# Patient Record
Sex: FEMALE | Race: BLACK OR AFRICAN AMERICAN | NOT HISPANIC OR LATINO | Employment: OTHER | URBAN - METROPOLITAN AREA
[De-identification: names, ages, dates, MRNs, and addresses within clinical notes are randomized per-mention and may not be internally consistent; named-entity substitution may affect disease eponyms.]

---

## 2017-01-30 ENCOUNTER — ALLSCRIPTS OFFICE VISIT (OUTPATIENT)
Dept: OTHER | Facility: OTHER | Age: 52
End: 2017-01-30

## 2017-04-24 ENCOUNTER — ALLSCRIPTS OFFICE VISIT (OUTPATIENT)
Dept: OTHER | Facility: OTHER | Age: 52
End: 2017-04-24

## 2017-04-25 ENCOUNTER — TRANSCRIBE ORDERS (OUTPATIENT)
Dept: ADMINISTRATIVE | Facility: HOSPITAL | Age: 52
End: 2017-04-25

## 2017-04-25 DIAGNOSIS — N63.0 BREAST NODULE: Primary | ICD-10-CM

## 2017-05-03 ENCOUNTER — HOSPITAL ENCOUNTER (OUTPATIENT)
Dept: RADIOLOGY | Facility: HOSPITAL | Age: 52
Discharge: HOME | End: 2017-05-03
Payer: MEDICARE

## 2017-05-03 DIAGNOSIS — N63.0 BREAST NODULE: ICD-10-CM

## 2017-05-03 PROCEDURE — 76642 ULTRASOUND BREAST LIMITED: CPT

## 2017-05-16 RX ORDER — NUT.TX.GLUC.INTOLER,LAC-FR,SOY
120 LIQUID (ML) ORAL 2 TIMES DAILY
COMMUNITY
End: 2018-12-05

## 2017-05-16 RX ORDER — INSULIN GLARGINE 100 [IU]/ML
10 INJECTION, SOLUTION SUBCUTANEOUS DAILY
COMMUNITY

## 2017-05-16 RX ORDER — DIAZEPAM 10 MG/2ML
GEL RECTAL AS NEEDED
COMMUNITY

## 2017-05-18 PROBLEM — K05.30 ADULT PERIODONTITIS: Chronic | Status: ACTIVE | Noted: 2017-05-18

## 2017-05-19 ENCOUNTER — ANESTHESIA EVENT (OUTPATIENT)
Dept: PERIOP | Facility: HOSPITAL | Age: 52
End: 2017-05-19
Payer: MEDICARE

## 2017-05-22 ENCOUNTER — ANESTHESIA (OUTPATIENT)
Dept: PERIOP | Facility: HOSPITAL | Age: 52
End: 2017-05-22
Payer: MEDICARE

## 2017-05-22 ENCOUNTER — HOSPITAL ENCOUNTER (OUTPATIENT)
Facility: HOSPITAL | Age: 52
Setting detail: OUTPATIENT SURGERY
Discharge: ANOTHER HOSPITAL | End: 2017-05-22
Attending: DENTIST | Admitting: DENTIST
Payer: MEDICARE

## 2017-05-22 ENCOUNTER — HOSPITAL ENCOUNTER (OUTPATIENT)
Dept: RADIOLOGY | Facility: HOSPITAL | Age: 52
Setting detail: OUTPATIENT SURGERY
Discharge: HOME | End: 2017-05-22
Payer: MEDICARE

## 2017-05-22 VITALS
OXYGEN SATURATION: 94 % | HEART RATE: 98 BPM | RESPIRATION RATE: 18 BRPM | DIASTOLIC BLOOD PRESSURE: 73 MMHG | WEIGHT: 113.4 LBS | HEIGHT: 57 IN | SYSTOLIC BLOOD PRESSURE: 104 MMHG | TEMPERATURE: 97.2 F | BODY MASS INDEX: 24.47 KG/M2

## 2017-05-22 PROBLEM — K05.30 ADULT PERIODONTITIS: Chronic | Status: RESOLVED | Noted: 2017-05-18 | Resolved: 2017-05-22

## 2017-05-22 RX ORDER — CLINDAMYCIN PHOSPHATE 600 MG/50ML
600 INJECTION INTRAVENOUS
Status: DISCONTINUED | OUTPATIENT
Start: 2017-05-22 | End: 2017-05-22 | Stop reason: HOSPADM

## 2017-05-22 RX ORDER — LIDOCAINE HYDROCHLORIDE 10 MG/ML
INJECTION, SOLUTION INFILTRATION; PERINEURAL AS NEEDED
Status: DISCONTINUED | OUTPATIENT
Start: 2017-05-22 | End: 2017-05-22 | Stop reason: SURG

## 2017-05-22 RX ORDER — CHLORHEXIDINE GLUCONATE 0.12 MG/ML
RINSE ORAL AS NEEDED
Status: DISCONTINUED | OUTPATIENT
Start: 2017-05-22 | End: 2017-05-22 | Stop reason: HOSPADM

## 2017-05-22 RX ORDER — FENTANYL CITRATE 50 UG/ML
INJECTION, SOLUTION INTRAMUSCULAR; INTRAVENOUS AS NEEDED
Status: DISCONTINUED | OUTPATIENT
Start: 2017-05-22 | End: 2017-05-22 | Stop reason: SURG

## 2017-05-22 RX ORDER — GLYCOPYRROLATE 0.2 MG/ML
INJECTION INTRAMUSCULAR; INTRAVENOUS AS NEEDED
Status: DISCONTINUED | OUTPATIENT
Start: 2017-05-22 | End: 2017-05-22 | Stop reason: SURG

## 2017-05-22 RX ORDER — CLINDAMYCIN PHOSPHATE 150 MG/ML
INJECTION, SOLUTION INTRAVENOUS AS NEEDED
Status: DISCONTINUED | OUTPATIENT
Start: 2017-05-22 | End: 2017-05-22 | Stop reason: SURG

## 2017-05-22 RX ORDER — ROCURONIUM BROMIDE 10 MG/ML
INJECTION, SOLUTION INTRAVENOUS AS NEEDED
Status: DISCONTINUED | OUTPATIENT
Start: 2017-05-22 | End: 2017-05-22 | Stop reason: SURG

## 2017-05-22 RX ORDER — SODIUM CHLORIDE, SODIUM LACTATE, POTASSIUM CHLORIDE, CALCIUM CHLORIDE 600; 310; 30; 20 MG/100ML; MG/100ML; MG/100ML; MG/100ML
75 INJECTION, SOLUTION INTRAVENOUS CONTINUOUS
Status: DISCONTINUED | OUTPATIENT
Start: 2017-05-22 | End: 2017-05-22 | Stop reason: HOSPADM

## 2017-05-22 RX ORDER — BUPIVACAINE HYDROCHLORIDE AND EPINEPHRINE 5; 5 MG/ML; UG/ML
INJECTION, SOLUTION PERINEURAL AS NEEDED
Status: DISCONTINUED | OUTPATIENT
Start: 2017-05-22 | End: 2017-05-22 | Stop reason: HOSPADM

## 2017-05-22 RX ORDER — SODIUM CHLORIDE, SODIUM LACTATE, POTASSIUM CHLORIDE, CALCIUM CHLORIDE 600; 310; 30; 20 MG/100ML; MG/100ML; MG/100ML; MG/100ML
75 INJECTION, SOLUTION INTRAVENOUS CONTINUOUS
Status: CANCELLED | OUTPATIENT
Start: 2017-05-22

## 2017-05-22 RX ORDER — ONDANSETRON 2 MG/ML
INJECTION INTRAMUSCULAR; INTRAVENOUS AS NEEDED
Status: DISCONTINUED | OUTPATIENT
Start: 2017-05-22 | End: 2017-05-22 | Stop reason: SURG

## 2017-05-22 RX ORDER — PROPOFOL 10 MG/ML
INJECTION, EMULSION INTRAVENOUS AS NEEDED
Status: DISCONTINUED | OUTPATIENT
Start: 2017-05-22 | End: 2017-05-22 | Stop reason: SURG

## 2017-05-22 RX ADMIN — CLINDAMYCIN PHOSPHATE 600 MG: 150 INJECTION, SOLUTION INTRAMUSCULAR; INTRAVENOUS at 07:58

## 2017-05-22 RX ADMIN — LIDOCAINE HYDROCHLORIDE 40 MG: 10 INJECTION, SOLUTION INFILTRATION; PERINEURAL at 07:55

## 2017-05-22 RX ADMIN — ROCURONIUM BROMIDE 30 MG: 10 INJECTION, SOLUTION INTRAVENOUS at 07:55

## 2017-05-22 RX ADMIN — PHENYLEPHRINE HYDROCHLORIDE 0.1 MCG: 10 INJECTION, SOLUTION INTRAMUSCULAR; INTRAVENOUS; SUBCUTANEOUS at 08:02

## 2017-05-22 RX ADMIN — FENTANYL CITRATE 100 MCG: 50 INJECTION, SOLUTION INTRAMUSCULAR; INTRAVENOUS at 07:53

## 2017-05-22 RX ADMIN — PROPOFOL 100 MG: 10 INJECTION, EMULSION INTRAVENOUS at 07:55

## 2017-05-22 RX ADMIN — GLYCOPYRROLATE 0.4 MG: 0.2 INJECTION, SOLUTION INTRAMUSCULAR; INTRAVENOUS at 09:25

## 2017-05-22 RX ADMIN — NEOSTIGMINE METHYLSULFATE 3 MG: 1 INJECTION, SOLUTION INTRAMUSCULAR; INTRAVENOUS; SUBCUTANEOUS at 09:25

## 2017-05-22 RX ADMIN — DEXAMETHASONE SODIUM PHOSPHATE 4 MG: 10 INJECTION INTRAMUSCULAR; INTRAVENOUS at 08:08

## 2017-05-22 RX ADMIN — ONDANSETRON 4 MG: 2 INJECTION INTRAMUSCULAR; INTRAVENOUS at 09:27

## 2017-05-22 RX ADMIN — SODIUM CHLORIDE, SODIUM LACTATE, POTASSIUM CHLORIDE, AND CALCIUM CHLORIDE: .6; .31; .03; .02 INJECTION, SOLUTION INTRAVENOUS at 07:47

## 2017-06-05 ENCOUNTER — ANESTHESIA EVENT (OUTPATIENT)
Dept: PERIOP | Facility: HOSPITAL | Age: 52
End: 2017-06-05
Payer: MEDICARE

## 2017-06-05 RX ORDER — SODIUM CHLORIDE, SODIUM LACTATE, POTASSIUM CHLORIDE, CALCIUM CHLORIDE 600; 310; 30; 20 MG/100ML; MG/100ML; MG/100ML; MG/100ML
125 INJECTION, SOLUTION INTRAVENOUS CONTINUOUS
Status: CANCELLED | OUTPATIENT
Start: 2017-06-06

## 2017-06-06 ENCOUNTER — CONVERSION ENCOUNTER (OUTPATIENT)
Dept: RADIOLOGY | Facility: HOSPITAL | Age: 52
End: 2017-06-06

## 2017-06-06 ENCOUNTER — APPOINTMENT (OUTPATIENT)
Dept: RADIOLOGY | Facility: HOSPITAL | Age: 52
End: 2017-06-06
Payer: MEDICARE

## 2017-06-06 ENCOUNTER — HOSPITAL ENCOUNTER (OUTPATIENT)
Dept: RADIOLOGY | Facility: HOSPITAL | Age: 52
Discharge: HOME | End: 2017-06-06
Attending: SPECIALIST | Admitting: RADIOLOGY
Payer: MEDICARE

## 2017-06-06 ENCOUNTER — ANESTHESIA (OUTPATIENT)
Dept: PERIOP | Facility: HOSPITAL | Age: 52
End: 2017-06-06
Payer: MEDICARE

## 2017-06-06 ENCOUNTER — ANESTHESIA EVENT (OUTPATIENT)
Dept: RADIOLOGY | Facility: HOSPITAL | Age: 52
End: 2017-06-06
Payer: MEDICARE

## 2017-06-06 ENCOUNTER — ANESTHESIA (OUTPATIENT)
Dept: RADIOLOGY | Facility: HOSPITAL | Age: 52
End: 2017-06-06
Payer: MEDICARE

## 2017-06-06 ENCOUNTER — HOSPITAL ENCOUNTER (OUTPATIENT)
Facility: HOSPITAL | Age: 52
Setting detail: OUTPATIENT SURGERY
End: 2017-06-06
Attending: SPECIALIST | Admitting: SPECIALIST
Payer: MEDICARE

## 2017-06-06 VITALS
HEART RATE: 74 BPM | OXYGEN SATURATION: 98 % | HEIGHT: 57 IN | BODY MASS INDEX: 24.38 KG/M2 | WEIGHT: 113 LBS | DIASTOLIC BLOOD PRESSURE: 63 MMHG | SYSTOLIC BLOOD PRESSURE: 99 MMHG | RESPIRATION RATE: 18 BRPM

## 2017-06-06 VITALS
RESPIRATION RATE: 16 BRPM | OXYGEN SATURATION: 100 % | TEMPERATURE: 95 F | SYSTOLIC BLOOD PRESSURE: 110 MMHG | DIASTOLIC BLOOD PRESSURE: 71 MMHG | HEART RATE: 92 BPM

## 2017-06-06 DIAGNOSIS — N63.20 LEFT BREAST MASS: ICD-10-CM

## 2017-06-06 DIAGNOSIS — N63.0 BREAST LUMP: ICD-10-CM

## 2017-06-06 DIAGNOSIS — N63.10 BREAST MASS, RIGHT: ICD-10-CM

## 2017-06-06 LAB — B-HCG SERPL-ACNC: <2 MIU/ML

## 2017-06-06 PROCEDURE — 84702 CHORIONIC GONADOTROPIN TEST: CPT | Performed by: SPECIALIST

## 2017-06-06 PROCEDURE — 76098 X-RAY EXAM SURGICAL SPECIMEN: CPT

## 2017-06-06 PROCEDURE — 88342 IMHCHEM/IMCYTCHM 1ST ANTB: CPT | Performed by: SPECIALIST

## 2017-06-06 PROCEDURE — 88341 IMHCHEM/IMCYTCHM EA ADD ANTB: CPT | Performed by: SPECIALIST

## 2017-06-06 PROCEDURE — 19286 PERQ DEV BREAST ADD US IMAG: CPT

## 2017-06-06 PROCEDURE — 88305 TISSUE EXAM BY PATHOLOGIST: CPT | Performed by: SPECIALIST

## 2017-06-06 PROCEDURE — 19285 PERQ DEV BREAST 1ST US IMAG: CPT

## 2017-06-06 RX ORDER — IBUPROFEN 400 MG/1
400 TABLET ORAL EVERY 6 HOURS PRN
Qty: 10 TABLET | Refills: 0 | Status: SHIPPED | OUTPATIENT
Start: 2017-06-06 | End: 2018-12-05

## 2017-06-06 RX ORDER — BUPIVACAINE HYDROCHLORIDE AND EPINEPHRINE 5; 5 MG/ML; UG/ML
INJECTION, SOLUTION EPIDURAL; INTRACAUDAL; PERINEURAL AS NEEDED
Status: DISCONTINUED | OUTPATIENT
Start: 2017-06-06 | End: 2017-06-06 | Stop reason: HOSPADM

## 2017-06-06 RX ORDER — SODIUM CHLORIDE, SODIUM LACTATE, POTASSIUM CHLORIDE, CALCIUM CHLORIDE 600; 310; 30; 20 MG/100ML; MG/100ML; MG/100ML; MG/100ML
125 INJECTION, SOLUTION INTRAVENOUS CONTINUOUS
Status: DISCONTINUED | OUTPATIENT
Start: 2017-06-06 | End: 2017-06-06

## 2017-06-06 RX ORDER — LIDOCAINE HYDROCHLORIDE 10 MG/ML
INJECTION, SOLUTION INFILTRATION; PERINEURAL AS NEEDED
Status: DISCONTINUED | OUTPATIENT
Start: 2017-06-06 | End: 2017-06-06 | Stop reason: SURG

## 2017-06-06 RX ORDER — LIDOCAINE HYDROCHLORIDE 10 MG/ML
INJECTION, SOLUTION INFILTRATION; PERINEURAL CODE/TRAUMA/SEDATION MEDICATION
Status: COMPLETED | OUTPATIENT
Start: 2017-06-06 | End: 2017-06-06

## 2017-06-06 RX ORDER — PROPOFOL 10 MG/ML
INJECTION, EMULSION INTRAVENOUS AS NEEDED
Status: DISCONTINUED | OUTPATIENT
Start: 2017-06-06 | End: 2017-06-06 | Stop reason: SURG

## 2017-06-06 RX ORDER — SODIUM CHLORIDE, SODIUM LACTATE, POTASSIUM CHLORIDE, CALCIUM CHLORIDE 600; 310; 30; 20 MG/100ML; MG/100ML; MG/100ML; MG/100ML
125 INJECTION, SOLUTION INTRAVENOUS CONTINUOUS
Status: DISCONTINUED | OUTPATIENT
Start: 2017-06-06 | End: 2017-06-06 | Stop reason: HOSPADM

## 2017-06-06 RX ORDER — FENTANYL CITRATE 50 UG/ML
INJECTION, SOLUTION INTRAMUSCULAR; INTRAVENOUS AS NEEDED
Status: DISCONTINUED | OUTPATIENT
Start: 2017-06-06 | End: 2017-06-06 | Stop reason: SURG

## 2017-06-06 RX ORDER — ONDANSETRON 2 MG/ML
4 INJECTION INTRAMUSCULAR; INTRAVENOUS ONCE AS NEEDED
Status: DISCONTINUED | OUTPATIENT
Start: 2017-06-06 | End: 2017-06-07 | Stop reason: HOSPADM

## 2017-06-06 RX ORDER — ONDANSETRON 2 MG/ML
4 INJECTION INTRAMUSCULAR; INTRAVENOUS ONCE AS NEEDED
Status: CANCELLED | OUTPATIENT
Start: 2017-06-06

## 2017-06-06 RX ADMIN — PROPOFOL 10 MG: 10 INJECTION, EMULSION INTRAVENOUS at 10:10

## 2017-06-06 RX ADMIN — CEFAZOLIN SODIUM 1000 MG: 1 SOLUTION INTRAVENOUS at 10:05

## 2017-06-06 RX ADMIN — PROPOFOL 50 MG: 10 INJECTION, EMULSION INTRAVENOUS at 10:05

## 2017-06-06 RX ADMIN — PROPOFOL 10 MG: 10 INJECTION, EMULSION INTRAVENOUS at 10:15

## 2017-06-06 RX ADMIN — PROPOFOL 10 MG: 10 INJECTION, EMULSION INTRAVENOUS at 08:43

## 2017-06-06 RX ADMIN — PROPOFOL 20 MG: 10 INJECTION, EMULSION INTRAVENOUS at 10:40

## 2017-06-06 RX ADMIN — PROPOFOL 20 MG: 10 INJECTION, EMULSION INTRAVENOUS at 10:30

## 2017-06-06 RX ADMIN — LIDOCAINE HYDROCHLORIDE 3 ML: 10 INJECTION, SOLUTION INFILTRATION; PERINEURAL at 08:54

## 2017-06-06 RX ADMIN — PROPOFOL 20 MG: 10 INJECTION, EMULSION INTRAVENOUS at 10:50

## 2017-06-06 RX ADMIN — LIDOCAINE HYDROCHLORIDE 2 ML: 10 INJECTION, SOLUTION INFILTRATION; PERINEURAL at 08:49

## 2017-06-06 RX ADMIN — FENTANYL CITRATE 25 MCG: 50 INJECTION, SOLUTION INTRAMUSCULAR; INTRAVENOUS at 10:23

## 2017-06-06 RX ADMIN — FENTANYL CITRATE 25 MCG: 50 INJECTION, SOLUTION INTRAMUSCULAR; INTRAVENOUS at 10:18

## 2017-06-06 RX ADMIN — PROPOFOL 50 MG: 10 INJECTION, EMULSION INTRAVENOUS at 08:41

## 2017-06-06 RX ADMIN — PROPOFOL 20 MG: 10 INJECTION, EMULSION INTRAVENOUS at 10:56

## 2017-06-06 RX ADMIN — SODIUM CHLORIDE, SODIUM LACTATE, POTASSIUM CHLORIDE, AND CALCIUM CHLORIDE 125 ML/HR: .6; .31; .03; .02 INJECTION, SOLUTION INTRAVENOUS at 08:19

## 2017-06-06 RX ADMIN — LIDOCAINE HYDROCHLORIDE 50 MG: 10 INJECTION, SOLUTION INFILTRATION; PERINEURAL at 10:05

## 2017-06-06 RX ADMIN — PROPOFOL 20 MG: 10 INJECTION, EMULSION INTRAVENOUS at 10:23

## 2017-06-06 RX ADMIN — PROPOFOL 20 MG: 10 INJECTION, EMULSION INTRAVENOUS at 10:18

## 2017-06-06 RX ADMIN — LIDOCAINE HYDROCHLORIDE 50 MG: 20 INJECTION, SOLUTION INTRAVENOUS at 08:41

## 2017-06-06 RX ADMIN — PROPOFOL 10 MG: 10 INJECTION, EMULSION INTRAVENOUS at 08:51

## 2017-06-06 RX ADMIN — PROPOFOL 10 MG: 10 INJECTION, EMULSION INTRAVENOUS at 08:45

## 2017-06-06 RX ADMIN — FENTANYL CITRATE 25 MCG: 50 INJECTION, SOLUTION INTRAMUSCULAR; INTRAVENOUS at 10:34

## 2017-06-06 RX ADMIN — PROPOFOL 10 MG: 10 INJECTION, EMULSION INTRAVENOUS at 08:48

## 2017-06-06 RX ADMIN — FENTANYL CITRATE 25 MCG: 50 INJECTION, SOLUTION INTRAMUSCULAR; INTRAVENOUS at 10:05

## 2018-12-05 ENCOUNTER — APPOINTMENT (EMERGENCY)
Dept: RADIOLOGY | Facility: HOSPITAL | Age: 53
DRG: 202 | End: 2018-12-05
Payer: MEDICARE

## 2018-12-05 ENCOUNTER — HOSPITAL ENCOUNTER (INPATIENT)
Facility: HOSPITAL | Age: 53
LOS: 2 days | Discharge: NURSING, REHAB OR LONG-TERM CARE | DRG: 202 | End: 2018-12-07
Attending: EMERGENCY MEDICINE | Admitting: INTERNAL MEDICINE
Payer: MEDICARE

## 2018-12-05 DIAGNOSIS — E87.1 HYPONATREMIA: Primary | ICD-10-CM

## 2018-12-05 DIAGNOSIS — I10 HYPERTENSION: ICD-10-CM

## 2018-12-05 DIAGNOSIS — E83.42 HYPOMAGNESEMIA: ICD-10-CM

## 2018-12-05 DIAGNOSIS — J40 BRONCHITIS: ICD-10-CM

## 2018-12-05 LAB
ALBUMIN SERPL BCP-MCNC: 2.9 G/DL (ref 3.5–5)
ALP SERPL-CCNC: 123 U/L (ref 46–116)
ALT SERPL W P-5'-P-CCNC: 13 U/L (ref 12–78)
ANION GAP SERPL CALCULATED.3IONS-SCNC: 10 MMOL/L (ref 4–13)
APTT PPP: 30 SECONDS (ref 24–33)
AST SERPL W P-5'-P-CCNC: 15 U/L (ref 5–45)
BACTERIA UR QL AUTO: NORMAL /HPF
BASOPHILS # BLD AUTO: 0.01 THOUSANDS/ΜL (ref 0–0.1)
BASOPHILS NFR BLD AUTO: 0 % (ref 0–1)
BILIRUB SERPL-MCNC: 0.1 MG/DL (ref 0.2–1)
BILIRUB UR QL STRIP: NEGATIVE
BUN SERPL-MCNC: 10 MG/DL (ref 5–25)
CALCIUM SERPL-MCNC: 9.4 MG/DL (ref 8.3–10.1)
CHLORIDE SERPL-SCNC: 91 MMOL/L (ref 100–108)
CLARITY UR: CLEAR
CO2 SERPL-SCNC: 26 MMOL/L (ref 21–32)
COLOR UR: ABNORMAL
CREAT SERPL-MCNC: 0.72 MG/DL (ref 0.6–1.3)
DEPRECATED D DIMER PPP: 630 NG/ML (FEU) (ref 190–520)
EOSINOPHIL # BLD AUTO: 0.07 THOUSAND/ΜL (ref 0–0.61)
EOSINOPHIL NFR BLD AUTO: 1 % (ref 0–6)
ERYTHROCYTE [DISTWIDTH] IN BLOOD BY AUTOMATED COUNT: 16.1 % (ref 11.6–15.1)
GFR SERPL CREATININE-BSD FRML MDRD: 110 ML/MIN/1.73SQ M
GLUCOSE SERPL-MCNC: 216 MG/DL (ref 65–140)
GLUCOSE SERPL-MCNC: 217 MG/DL (ref 65–140)
GLUCOSE SERPL-MCNC: 229 MG/DL (ref 65–140)
GLUCOSE UR STRIP-MCNC: ABNORMAL MG/DL
HCT VFR BLD AUTO: 39.4 % (ref 34.8–46.1)
HGB BLD-MCNC: 12.8 G/DL (ref 11.5–15.4)
HGB UR QL STRIP.AUTO: ABNORMAL
IMM GRANULOCYTES # BLD AUTO: 0.07 THOUSAND/UL (ref 0–0.2)
IMM GRANULOCYTES NFR BLD AUTO: 1 % (ref 0–2)
INR PPP: 0.9 (ref 0.86–1.16)
KETONES UR STRIP-MCNC: ABNORMAL MG/DL
LEUKOCYTE ESTERASE UR QL STRIP: NEGATIVE
LYMPHOCYTES # BLD AUTO: 2.95 THOUSANDS/ΜL (ref 0.6–4.47)
LYMPHOCYTES NFR BLD AUTO: 26 % (ref 14–44)
MAGNESIUM SERPL-MCNC: 1.5 MG/DL (ref 1.6–2.6)
MCH RBC QN AUTO: 27.6 PG (ref 26.8–34.3)
MCHC RBC AUTO-ENTMCNC: 32.5 G/DL (ref 31.4–37.4)
MCV RBC AUTO: 85 FL (ref 82–98)
MONOCYTES # BLD AUTO: 1.33 THOUSAND/ΜL (ref 0.17–1.22)
MONOCYTES NFR BLD AUTO: 12 % (ref 4–12)
NEUTROPHILS # BLD AUTO: 6.75 THOUSANDS/ΜL (ref 1.85–7.62)
NEUTS SEG NFR BLD AUTO: 60 % (ref 43–75)
NITRITE UR QL STRIP: NEGATIVE
NON-SQ EPI CELLS URNS QL MICRO: NORMAL /HPF
NRBC BLD AUTO-RTO: 0 /100 WBCS
NT-PROBNP SERPL-MCNC: 238 PG/ML
PH UR STRIP.AUTO: 8 [PH] (ref 5–9)
PHOSPHATE SERPL-MCNC: 1.8 MG/DL (ref 2.7–4.5)
PLATELET # BLD AUTO: 204 THOUSANDS/UL (ref 149–390)
PLATELET # BLD AUTO: 227 THOUSANDS/UL (ref 149–390)
PMV BLD AUTO: 10.8 FL (ref 8.9–12.7)
PMV BLD AUTO: 11.3 FL (ref 8.9–12.7)
POTASSIUM SERPL-SCNC: 5.1 MMOL/L (ref 3.5–5.3)
PROT SERPL-MCNC: 7.8 G/DL (ref 6.4–8.2)
PROT UR STRIP-MCNC: NEGATIVE MG/DL
PROTHROMBIN TIME: 9.5 SECONDS (ref 9.4–11.7)
RBC # BLD AUTO: 4.63 MILLION/UL (ref 3.81–5.12)
RBC #/AREA URNS AUTO: NORMAL /HPF
SODIUM SERPL-SCNC: 127 MMOL/L (ref 136–145)
SP GR UR STRIP.AUTO: 1.01 (ref 1–1.03)
TROPONIN I SERPL-MCNC: <0.02 NG/ML
TSH SERPL DL<=0.05 MIU/L-ACNC: 2.95 UIU/ML (ref 0.36–3.74)
UROBILINOGEN UR QL STRIP.AUTO: 0.2 E.U./DL
WBC # BLD AUTO: 11.18 THOUSAND/UL (ref 4.31–10.16)
WBC #/AREA URNS AUTO: NORMAL /HPF

## 2018-12-05 PROCEDURE — 83880 ASSAY OF NATRIURETIC PEPTIDE: CPT | Performed by: EMERGENCY MEDICINE

## 2018-12-05 PROCEDURE — 96361 HYDRATE IV INFUSION ADD-ON: CPT

## 2018-12-05 PROCEDURE — 36415 COLL VENOUS BLD VENIPUNCTURE: CPT | Performed by: EMERGENCY MEDICINE

## 2018-12-05 PROCEDURE — 81001 URINALYSIS AUTO W/SCOPE: CPT | Performed by: EMERGENCY MEDICINE

## 2018-12-05 PROCEDURE — 87081 CULTURE SCREEN ONLY: CPT | Performed by: INTERNAL MEDICINE

## 2018-12-05 PROCEDURE — 71275 CT ANGIOGRAPHY CHEST: CPT

## 2018-12-05 PROCEDURE — 83735 ASSAY OF MAGNESIUM: CPT | Performed by: EMERGENCY MEDICINE

## 2018-12-05 PROCEDURE — 96365 THER/PROPH/DIAG IV INF INIT: CPT

## 2018-12-05 PROCEDURE — 85730 THROMBOPLASTIN TIME PARTIAL: CPT | Performed by: EMERGENCY MEDICINE

## 2018-12-05 PROCEDURE — 85049 AUTOMATED PLATELET COUNT: CPT | Performed by: INTERNAL MEDICINE

## 2018-12-05 PROCEDURE — 85610 PROTHROMBIN TIME: CPT | Performed by: EMERGENCY MEDICINE

## 2018-12-05 PROCEDURE — 80053 COMPREHEN METABOLIC PANEL: CPT | Performed by: EMERGENCY MEDICINE

## 2018-12-05 PROCEDURE — 71045 X-RAY EXAM CHEST 1 VIEW: CPT

## 2018-12-05 PROCEDURE — 84484 ASSAY OF TROPONIN QUANT: CPT | Performed by: EMERGENCY MEDICINE

## 2018-12-05 PROCEDURE — 99285 EMERGENCY DEPT VISIT HI MDM: CPT

## 2018-12-05 PROCEDURE — 85379 FIBRIN DEGRADATION QUANT: CPT | Performed by: EMERGENCY MEDICINE

## 2018-12-05 PROCEDURE — 94640 AIRWAY INHALATION TREATMENT: CPT

## 2018-12-05 PROCEDURE — 84443 ASSAY THYROID STIM HORMONE: CPT | Performed by: EMERGENCY MEDICINE

## 2018-12-05 PROCEDURE — 93005 ELECTROCARDIOGRAM TRACING: CPT

## 2018-12-05 PROCEDURE — 84100 ASSAY OF PHOSPHORUS: CPT | Performed by: EMERGENCY MEDICINE

## 2018-12-05 PROCEDURE — 85025 COMPLETE CBC W/AUTO DIFF WBC: CPT | Performed by: EMERGENCY MEDICINE

## 2018-12-05 PROCEDURE — 82948 REAGENT STRIP/BLOOD GLUCOSE: CPT

## 2018-12-05 RX ORDER — CEFTRIAXONE 1 G/50ML
1000 INJECTION, SOLUTION INTRAVENOUS EVERY 24 HOURS
Status: DISCONTINUED | OUTPATIENT
Start: 2018-12-05 | End: 2018-12-07 | Stop reason: HOSPADM

## 2018-12-05 RX ORDER — ENALAPRIL MALEATE 2.5 MG/1
2.5 TABLET ORAL DAILY
COMMUNITY

## 2018-12-05 RX ORDER — LORAZEPAM 2 MG/ML
1 INJECTION INTRAMUSCULAR EVERY 6 HOURS PRN
Status: DISCONTINUED | OUTPATIENT
Start: 2018-12-05 | End: 2018-12-07 | Stop reason: HOSPADM

## 2018-12-05 RX ORDER — LEVOTHYROXINE SODIUM 0.05 MG/1
50 TABLET ORAL
Status: DISCONTINUED | OUTPATIENT
Start: 2018-12-06 | End: 2018-12-07 | Stop reason: HOSPADM

## 2018-12-05 RX ORDER — MAGNESIUM SULFATE HEPTAHYDRATE 40 MG/ML
2 INJECTION, SOLUTION INTRAVENOUS ONCE
Status: COMPLETED | OUTPATIENT
Start: 2018-12-05 | End: 2018-12-05

## 2018-12-05 RX ORDER — ASPIRIN 81 MG
200 TABLET, DELAYED RELEASE (ENTERIC COATED) ORAL DAILY
COMMUNITY

## 2018-12-05 RX ORDER — LEVETIRACETAM 100 MG/ML
500 SOLUTION ORAL 2 TIMES DAILY
Status: DISCONTINUED | OUTPATIENT
Start: 2018-12-05 | End: 2018-12-07 | Stop reason: HOSPADM

## 2018-12-05 RX ORDER — CLONAZEPAM 0.5 MG/1
0.25 TABLET ORAL 2 TIMES DAILY
Status: DISCONTINUED | OUTPATIENT
Start: 2018-12-05 | End: 2018-12-07 | Stop reason: HOSPADM

## 2018-12-05 RX ORDER — LISINOPRIL 5 MG/1
5 TABLET ORAL DAILY
Status: DISCONTINUED | OUTPATIENT
Start: 2018-12-06 | End: 2018-12-07 | Stop reason: HOSPADM

## 2018-12-05 RX ORDER — METHYLPREDNISOLONE SODIUM SUCCINATE 125 MG/2ML
60 INJECTION, POWDER, LYOPHILIZED, FOR SOLUTION INTRAMUSCULAR; INTRAVENOUS ONCE
Status: COMPLETED | OUTPATIENT
Start: 2018-12-05 | End: 2018-12-05

## 2018-12-05 RX ORDER — ACETAMINOPHEN 325 MG/1
650 TABLET ORAL EVERY 6 HOURS PRN
Status: DISCONTINUED | OUTPATIENT
Start: 2018-12-05 | End: 2018-12-07 | Stop reason: HOSPADM

## 2018-12-05 RX ORDER — LEVALBUTEROL 1.25 MG/.5ML
1.25 SOLUTION, CONCENTRATE RESPIRATORY (INHALATION) EVERY 6 HOURS PRN
Status: DISCONTINUED | OUTPATIENT
Start: 2018-12-05 | End: 2018-12-07 | Stop reason: HOSPADM

## 2018-12-05 RX ORDER — POLYETHYLENE GLYCOL 3350 17 G/17G
17 POWDER, FOR SOLUTION ORAL DAILY
Status: DISCONTINUED | OUTPATIENT
Start: 2018-12-06 | End: 2018-12-07 | Stop reason: HOSPADM

## 2018-12-05 RX ORDER — VALPROIC ACID 250 MG/5ML
1000 SOLUTION ORAL EVERY 12 HOURS SCHEDULED
Status: DISCONTINUED | OUTPATIENT
Start: 2018-12-05 | End: 2018-12-07 | Stop reason: HOSPADM

## 2018-12-05 RX ORDER — IPRATROPIUM BROMIDE AND ALBUTEROL SULFATE 2.5; .5 MG/3ML; MG/3ML
3 SOLUTION RESPIRATORY (INHALATION) ONCE
Status: COMPLETED | OUTPATIENT
Start: 2018-12-05 | End: 2018-12-05

## 2018-12-05 RX ORDER — LEVETIRACETAM 100 MG/ML
500 SOLUTION ORAL 2 TIMES DAILY
COMMUNITY

## 2018-12-05 RX ORDER — SODIUM CHLORIDE 9 MG/ML
50 INJECTION, SOLUTION INTRAVENOUS CONTINUOUS
Status: DISCONTINUED | OUTPATIENT
Start: 2018-12-05 | End: 2018-12-06

## 2018-12-05 RX ORDER — FAMOTIDINE 20 MG/1
20 TABLET, FILM COATED ORAL 2 TIMES DAILY
Status: DISCONTINUED | OUTPATIENT
Start: 2018-12-05 | End: 2018-12-07 | Stop reason: HOSPADM

## 2018-12-05 RX ADMIN — CEFTRIAXONE 1000 MG: 1 INJECTION, SOLUTION INTRAVENOUS at 23:23

## 2018-12-05 RX ADMIN — MAGNESIUM SULFATE IN WATER 2 G: 40 INJECTION, SOLUTION INTRAVENOUS at 13:55

## 2018-12-05 RX ADMIN — IOHEXOL 85 ML: 350 INJECTION, SOLUTION INTRAVENOUS at 14:28

## 2018-12-05 RX ADMIN — SODIUM CHLORIDE 1000 ML: 0.9 INJECTION, SOLUTION INTRAVENOUS at 12:58

## 2018-12-05 RX ADMIN — IPRATROPIUM BROMIDE AND ALBUTEROL SULFATE 3 ML: 2.5; .5 SOLUTION RESPIRATORY (INHALATION) at 16:42

## 2018-12-05 RX ADMIN — AZITHROMYCIN MONOHYDRATE 500 MG: 500 INJECTION, POWDER, LYOPHILIZED, FOR SOLUTION INTRAVENOUS at 16:40

## 2018-12-05 RX ADMIN — INSULIN LISPRO 2 UNITS: 100 INJECTION, SOLUTION INTRAVENOUS; SUBCUTANEOUS at 23:35

## 2018-12-05 RX ADMIN — SODIUM CHLORIDE 50 ML/HR: 0.9 INJECTION, SOLUTION INTRAVENOUS at 20:36

## 2018-12-05 RX ADMIN — METHYLPREDNISOLONE SODIUM SUCCINATE 60 MG: 125 INJECTION, POWDER, FOR SOLUTION INTRAMUSCULAR; INTRAVENOUS at 16:30

## 2018-12-05 NOTE — ED PROVIDER NOTES
History  Chief Complaint   Patient presents with    Hypertension     sent from [facility] for tachycardia and hypertention,pt blind and does not talk     25-year-old female with past medical history of profound mental retardation, seizure, spastic diplegia, baseline deafness, baseline blindness, nonverbal at baseline, sent to the ER from nursing home for evaluation of hypertension and tachycardia  Patient does have history of hypertension and takes enalapril  Hypertension       Prior to Admission Medications   Prescriptions Last Dose Informant Patient Reported? Taking? Calcium Carbonate (CALCI-MIX PO)   Yes Yes   Si,250 mg by Per G Tube route 2 (two) times a day   Docusate Sodium 100 MG capsule   Yes Yes   Si mg by Per G Tube route daily   clonazePAM (KlonoPIN) 0 5 mg tablet   Yes Yes   Si 25 mg by Per G Tube route 2 (two) times a day     diazepam (DIASTAT) 10 mg   Yes Yes   Sig: Insert into the rectum as needed   enalapril (VASOTEC) 2 5 mg tablet   Yes Yes   Si 5 mg by Per G Tube route daily   famotidine (PEPCID) 20 mg tablet   Yes Yes   Si mg by Per G Tube route 2 (two) times a day     glucagon (GLUCAGEN HYPOKIT) 1 mg injection   Yes Yes   Si mg daily as needed for low blood sugar For blood sugar less than 70/   insulin glargine (LANTUS) 100 units/mL subcutaneous injection   Yes Yes   Sig: Inject 10 Units under the skin daily     insulin lispro (HumaLOG) 100 units/mL injection   Yes Yes   Sig: Inject 2 Units under the skin daily at bedtime   insulin regular (HumuLIN R,NovoLIN R) 100 units/mL injection   Yes Yes   Sig: Inject under the skin 3 (three) times a day before meals     ipratropium-albuterol (DUONEB) 0 5-2 5 mg/mL   Yes Yes   Sig: Inhale 3 mL every 6 (six) hours as needed     levETIRAcetam (KEPPRA) 100 mg/mL oral solution   Yes Yes   Si mg by Per G Tube route 2 (two) times a day   levothyroxine 25 mcg tablet   Yes Yes   Si mcg by Per G Tube route daily polyethylene glycol (MIRALAX) 17 g packet   Yes Yes   Si g by Per G Tube route daily     sitaGLIPtin-metFORMIN (JANUMET)  MG per tablet   Yes Yes   Si tablet by Per G Tube route 2 (two) times a day with meals   valproate sodium (DEPAKENE) 250 mg/5 mL syrup   Yes Yes   Si,250 mg by Per G Tube route 2 (two) times a day Takes 1000mg a m  And 1250 mg hs        Facility-Administered Medications: None       Past Medical History:   Diagnosis Date    Blind     Cerebral atrophy     Deaf     Diabetes mellitus ([address]     Diplegia ([address]     Fibrocystic breast     Glaucoma     Hepatitis B     carrier    Infectious viral hepatitis     Osteoporosis     Pica     PPD positive     Profound intellectual disabilities     RA (rheumatoid arthritis) (HCC)     [facility]     Rubella syndrome     Scoliosis     Seizure [facility]     Seizure [facility]     5 in 2016    Spastic diplegia ([facility]     Visual impairment        Past Surgical History:   Procedure Laterality Date    FEEDING TUBE PLACEMENT      [place] BX BREAST W DEVICE 1ST LESION ULTRASOUND GUIDE Left 2017    Procedure: US GUIDED BREAST BIOPSY NEEDLE LOCALIZED 8 AM;  Surgeon: Darline Michel MD;  Location: [address] [place] [address];  Service: General    NC DENTAL SURGERY PROCEDURE N/A 2017    Procedure: RESTORATIVE DENTAL PROCEDURE, DENTAL EXAM, FULL MOUTH XRAYS, ROOT PLANING AND SCALING, GINGIVECTOMY, IMPRESSIONS X2, PERIO CHARTING, PROPHYLAXIS, D/SENSE, FLUORIDE TREATMENT, COMPOSITE RESTORATION: #8-MFD, #7-DF, #9-MFD, #19-MOL;  Surgeon: Domi Vaughan DMD;  Location: [facility];  Service: Oral Surgery    US BREAST NEEDLE LOC LEFT Left 2017       History reviewed  No pertinent family history  I have reviewed and agree with the history as documented      Social History   Substance Use Topics    Smoking status: Never Smoker    Smokeless tobacco: Never Used    Alcohol use No        Review of Systems   Unable to perform ROS: Patient nonverbal       Physical Exam  Physical Exam   HENT:   Head: Normocephalic and atraumatic  Mouth/Throat: Oropharynx is clear and moist    Neck: Neck supple  Cardiovascular: Regular rhythm, normal heart sounds and intact distal pulses  Tachycardia on the monitor   Pulmonary/Chest: Effort normal and breath sounds normal    Lungs are clear to auscultation  Abdominal: Soft  Bowel sounds are normal  She exhibits no distension  There is no tenderness  G-tube noted in place   Musculoskeletal:   Contractures noted to bilateral upper and lower extremity  Neurological:   Neuro exam could not be performed due to baseline mental retardation  Patient is awake  Skin: Skin is warm and dry  Nursing note and vitals reviewed        Vital Signs  ED Triage Vitals [12/05/18 1227]   Temperature Pulse Respirations Blood Pressure SpO2   98 8 °F (37 1 °C) (!) 116 20 (!) 192/91 100 %      Temp Source Heart Rate Source Patient Position - Orthostatic VS BP Location FiO2 (%)   Tympanic Monitor Sitting Left arm --      Pain Score       No Pain           Vitals:    12/05/18 1227 12/05/18 1330 12/05/18 1447 12/05/18 1647   BP: (!) 192/91 (!) 211/126 (!) 186/86 167/91   Pulse: (!) 116 102 (!) 109 101   Patient Position - Orthostatic VS: Sitting Lying Sitting Lying       Visual Acuity      ED Medications  Medications   azithromycin (ZITHROMAX) 500 mg in sodium chloride 0 9% 250mL IVPB 500 mg (500 mg Intravenous New Bag 12/5/18 1640)   sodium chloride 0 9 % bolus 1,000 mL (0 mL Intravenous Stopped 12/5/18 1455)   magnesium sulfate 2 g/50 mL IVPB (premix) 2 g (0 g Intravenous Stopped 12/5/18 1455)   iohexol (OMNIPAQUE) 350 MG/ML injection (MULTI-DOSE) 85 mL (85 mL Intravenous Given 12/5/18 1428)   methylPREDNISolone sodium succinate (Solu-MEDROL) injection 60 mg (60 mg Intravenous Given 12/5/18 1630)   ipratropium-albuterol (DUO-NEB) 0 5-2 5 mg/3 mL inhalation solution 3 mL (3 mL Nebulization Given 12/5/18 1642)       Diagnostic Studies  Results Reviewed     Procedure Component Value Units Date/Time    POCT pregnancy, urine [[id number]]     Lab Status:  No result     Urine Microscopic [[id number]]  (Normal) Collected:  12/05/18 1328    Lab Status:  Final result Specimen:  Urine from Urine, Straight Cath Updated:  12/05/18 1347     RBC, UA None Seen /hpf      WBC, UA 0-5 /hpf      Epithelial Cells Occasional /hpf      Bacteria, UA Occasional /hpf     UA w Reflex to Microscopic w Reflex to Culture [[id number]]  (Abnormal) Collected:  12/05/18 1328    Lab Status:  Final result Specimen:  Urine from Urine, Straight Cath Updated:  12/05/18 1333     Color, UA Light Yellow     Clarity, UA Clear     Specific Gravity, UA 1 015     pH, UA 8 0     Leukocytes, UA Negative     Nitrite, UA Negative     Protein, UA Negative mg/dl      Glucose,  (1/10%) (A) mg/dl      Ketones, UA Trace (A) mg/dl      Urobilinogen, UA 0 2 E U /dl      Bilirubin, UA Negative     Blood, UA Trace-Intact (A)    Comprehensive metabolic panel [[id number]]  (Abnormal) Collected:  12/05/18 1255    Lab Status:  Final result Specimen:  Blood from Arm, Right Updated:  12/05/18 1330     Sodium 127 (L) mmol/L      Potassium 5 1 mmol/L      Chloride 91 (L) mmol/L      CO2 26 mmol/L      ANION GAP 10 mmol/L      BUN 10 mg/dL      Creatinine 0 72 mg/dL      Glucose 217 (H) mg/dL      Calcium 9 4 mg/dL      AST 15 U/L      ALT 13 U/L      Alkaline Phosphatase 123 (H) U/L      Total Protein 7 8 g/dL      Albumin 2 9 (L) g/dL      Total Bilirubin 0 10 (L) mg/dL      eGFR 110 ml/min/1 73sq m     Narrative:         National Kidney Disease Education Program recommendations are as follows:  GFR calculation is accurate only with a steady state creatinine  Chronic Kidney disease less than 60 ml/min/1 73 sq  meters  Kidney failure less than 15 ml/min/1 73 sq  meters      Magnesium [[id number]]  (Abnormal) Collected:  12/05/18 1255    Lab Status:  Final result Specimen:  Blood from Arm, Right Updated: 12/05/18 1330     Magnesium 1 5 (L) mg/dL     Phosphorus [[id number]]  (Abnormal) Collected:  12/05/18 1255    Lab Status:  Final result Specimen:  Blood from Arm, Right Updated:  12/05/18 1330     Phosphorus 1 8 (L) mg/dL     TSH, 3rd generation with Free T4 reflex [[id number]]  (Normal) Collected:  12/05/18 1255    Lab Status:  Final result Specimen:  Blood from Arm, Right Updated:  12/05/18 1330     TSH 3RD GENERATON 2 946 uIU/mL     Narrative:         Patients undergoing fluorescein dye angiography may retain small amounts of fluorescein in the body for 48-72 hours post procedure  Samples containing fluorescein can produce falsely depressed TSH values  If the patient had this procedure,a specimen should be resubmitted post fluorescein clearance            The recommended reference ranges for TSH during pregnancy are as follows:  First trimester 0 1 to 2 5 uIU/mL  Second trimester  0 2 to 3 0 uIU/mL  Third trimester 0 3 to 3 0 uIU/m      B-type natriuretic peptide [[id number]]  (Abnormal) Collected:  12/05/18 1255    Lab Status:  Final result Specimen:  Blood from Arm, Right Updated:  12/05/18 1330     NT-proBNP 238 (H) pg/mL     Troponin I [[id number]]  (Normal) Collected:  12/05/18 1255    Lab Status:  Final result Specimen:  Blood from Arm, Right Updated:  12/05/18 1321     Troponin I <0 02 ng/mL     Protime-INR [[id number]]  (Normal) Collected:  12/05/18 1255    Lab Status:  Final result Specimen:  Blood from Arm, Right Updated:  12/05/18 1318     Protime 9 5 seconds      INR 0 90    APTT [[id number]]  (Normal) Collected:  12/05/18 1255    Lab Status:  Final result Specimen:  Blood from Arm, Right Updated:  12/05/18 1318     PTT 30 seconds     D-Dimer [[id number]]  (Abnormal) Collected:  12/05/18 1255    Lab Status:  Final result Specimen:  Blood from Arm, Right Updated:  12/05/18 1318     D-Dimer, Quant 630 (H) ng/ml (FEU)     CBC and differential [[id number]]  (Abnormal) Collected:  12/05/18 1255    Lab Status:  Final result Specimen:  Blood from Arm, Right Updated:  12/05/18 1301     WBC 11 18 (H) Thousand/uL      RBC 4 63 Million/uL      Hemoglobin 12 8 g/dL      Hematocrit 39 4 %      MCV 85 fL      MCH 27 6 pg      MCHC 32 5 g/dL      RDW 16 1 (H) %      MPV 11 3 fL      Platelets 798 Thousands/uL      nRBC 0 /100 WBCs      Neutrophils Relative 60 %      Immat GRANS % 1 %      Lymphocytes Relative 26 %      Monocytes Relative 12 %      Eosinophils Relative 1 %      Basophils Relative 0 %      Neutrophils Absolute 6 75 Thousands/µL      Immature Grans Absolute 0 07 Thousand/uL      Lymphocytes Absolute 2 95 Thousands/µL      Monocytes Absolute 1 33 (H) Thousand/µL      Eosinophils Absolute 0 07 Thousand/µL      Basophils Absolute 0 01 Thousands/µL                  XR chest 1 view   Final Result by Li Nichols MD (12/05 7297)      No active pulmonary disease on examination which is somewhat limited secondary to low lung volumes  Workstation performed: [id number]         CTA ED chest PE study   Final Result by Sharon Madera MD (12/05 2183)      No pulmonary arterial embolism  No consolidation/infiltrate  Mild bilateral bronchial wall thickening most prominent in the lung bases in keeping with a nonspecific bronchitis  The study was marked in EPIC for significant notification  Workstation performed: [id number]                    Procedures  ECG 12 Lead Documentation  Date/Time: 12/5/2018 4:57 PM  Performed by: Heather Batista  Authorized by: Heather Batista     Indications / Diagnosis:  Hypertension  Patient location:  ED  Previous ECG:     Previous ECG:  Unavailable  Interpretation:     Interpretation: abnormal    Comments:      Sinus rhythm, rate 102, normal axis, normal intervals, no acute ST/T-wave abnormality noted, sinus tachycardia, otherwise unremarkable EKG, no previous EKG available for comparison             Phone Contacts  ED Phone Contact    ED Course MDM  Number of Diagnoses or Management Options  Bronchitis: new and requires workup  Hypertension: new and requires workup  Hypomagnesemia: new and requires workup  Hyponatremia: new and requires workup  Diagnosis management comments: Obtain blood work, EKG, D-dimer, chest x-ray       Amount and/or Complexity of Data Reviewed  Clinical lab tests: ordered and reviewed  Tests in the radiology section of CPT®: ordered and reviewed  Tests in the medicine section of CPT®: ordered and reviewed  Review and summarize past medical records: yes  Discuss the patient with other providers: yes  Independent visualization of images, tracings, or specimens: yes    Risk of Complications, Morbidity, and/or Mortality  General comments: Patient's heart rate improved in the ER with IV fluids  Patient's D-dimer was elevated which shows concern for possible PE  Subsequent CTA lungs did not show any PE however there is concern for bronchitis  Patient's blood work also shows concern for hyponatremia and hypomagnesemia  Magnesium repleted in the ED  Patient given 1 L fluid bolus in the ER  Neb treatment and azithromycin given for bronchitis  Patient subsequently admitted for further management of hypertension, hyponatremia, bronchitis      Patient Progress  Patient progress: stable    CritCare Time    Disposition  Final diagnoses:   Hyponatremia   Hypertension   Hypomagnesemia   Bronchitis     Time reflects when diagnosis was documented in both MDM as applicable and the Disposition within this note     Time User Action Codes Description Comment    12/5/2018  4:04 PM Sean Gould [E87 1] Hyponatremia     12/5/2018  4:05 PM Sean Gould [I10] Hypertension     12/5/2018  4:05 PM Cata Villarreal Add [E83 42] Hypomagnesemia     12/5/2018  4:05 PM Cata Villarreal Add [J40] Bronchitis       ED Disposition     ED Disposition Condition Comment    Admit  Case was discussed with Dr Eduard Castaneda and the patient's admission status was agreed to be Admission Status: inpatient status to the service of Dr Roro Bautista         Follow-up Information    None         Patient's Medications   Discharge Prescriptions    No medications on file     No discharge procedures on file      ED Provider  Electronically Signed by           Thelma Sheldon DO  12/05/18 4086

## 2018-12-05 NOTE — ED NOTES
Spoke with Dr Quentin Chiu re blood cultures who states she will not be ordering them and to continue with antibiotic administration       Christopher Reno RN  12/05/18 2892

## 2018-12-06 LAB
ANION GAP SERPL CALCULATED.3IONS-SCNC: 10 MMOL/L (ref 4–13)
ATRIAL RATE: 102 BPM
BUN SERPL-MCNC: 7 MG/DL (ref 5–25)
CALCIUM SERPL-MCNC: 9.5 MG/DL (ref 8.3–10.1)
CHLORIDE SERPL-SCNC: 98 MMOL/L (ref 100–108)
CO2 SERPL-SCNC: 26 MMOL/L (ref 21–32)
CREAT SERPL-MCNC: 0.69 MG/DL (ref 0.6–1.3)
ERYTHROCYTE [DISTWIDTH] IN BLOOD BY AUTOMATED COUNT: 16 % (ref 11.6–15.1)
EST. AVERAGE GLUCOSE BLD GHB EST-MCNC: 177 MG/DL
GFR SERPL CREATININE-BSD FRML MDRD: 114 ML/MIN/1.73SQ M
GLUCOSE SERPL-MCNC: 128 MG/DL (ref 65–140)
GLUCOSE SERPL-MCNC: 135 MG/DL (ref 65–140)
GLUCOSE SERPL-MCNC: 151 MG/DL (ref 65–140)
GLUCOSE SERPL-MCNC: 246 MG/DL (ref 65–140)
GLUCOSE SERPL-MCNC: 373 MG/DL (ref 65–140)
HBA1C MFR BLD: 7.8 % (ref 4.2–6.3)
HCT VFR BLD AUTO: 38.9 % (ref 34.8–46.1)
HGB BLD-MCNC: 12 G/DL (ref 11.5–15.4)
MAGNESIUM SERPL-MCNC: 2 MG/DL (ref 1.6–2.6)
MCH RBC QN AUTO: 26.8 PG (ref 26.8–34.3)
MCHC RBC AUTO-ENTMCNC: 30.8 G/DL (ref 31.4–37.4)
MCV RBC AUTO: 87 FL (ref 82–98)
OSMOLALITY UR/SERPL-RTO: 284 MMOL/KG (ref 282–298)
P AXIS: 56 DEGREES
PLATELET # BLD AUTO: 205 THOUSANDS/UL (ref 149–390)
PMV BLD AUTO: 11.2 FL (ref 8.9–12.7)
POTASSIUM SERPL-SCNC: 4.6 MMOL/L (ref 3.5–5.3)
PR INTERVAL: 134 MS
QRS AXIS: 47 DEGREES
QRSD INTERVAL: 64 MS
QT INTERVAL: 352 MS
QTC INTERVAL: 458 MS
RBC # BLD AUTO: 4.48 MILLION/UL (ref 3.81–5.12)
SODIUM SERPL-SCNC: 134 MMOL/L (ref 136–145)
T WAVE AXIS: 67 DEGREES
TSH SERPL DL<=0.05 MIU/L-ACNC: 1.41 UIU/ML (ref 0.36–3.74)
VENTRICULAR RATE: 102 BPM
WBC # BLD AUTO: 12.55 THOUSAND/UL (ref 4.31–10.16)

## 2018-12-06 PROCEDURE — G8997 SWALLOW GOAL STATUS: HCPCS

## 2018-12-06 PROCEDURE — 83036 HEMOGLOBIN GLYCOSYLATED A1C: CPT | Performed by: INTERNAL MEDICINE

## 2018-12-06 PROCEDURE — 83930 ASSAY OF BLOOD OSMOLALITY: CPT | Performed by: INTERNAL MEDICINE

## 2018-12-06 PROCEDURE — 94760 N-INVAS EAR/PLS OXIMETRY 1: CPT

## 2018-12-06 PROCEDURE — 92610 EVALUATE SWALLOWING FUNCTION: CPT

## 2018-12-06 PROCEDURE — 83735 ASSAY OF MAGNESIUM: CPT | Performed by: INTERNAL MEDICINE

## 2018-12-06 PROCEDURE — 80048 BASIC METABOLIC PNL TOTAL CA: CPT | Performed by: INTERNAL MEDICINE

## 2018-12-06 PROCEDURE — 99222 1ST HOSP IP/OBS MODERATE 55: CPT | Performed by: INTERNAL MEDICINE

## 2018-12-06 PROCEDURE — 85027 COMPLETE CBC AUTOMATED: CPT | Performed by: INTERNAL MEDICINE

## 2018-12-06 PROCEDURE — G8996 SWALLOW CURRENT STATUS: HCPCS

## 2018-12-06 PROCEDURE — 93010 ELECTROCARDIOGRAM REPORT: CPT | Performed by: INTERNAL MEDICINE

## 2018-12-06 PROCEDURE — 82948 REAGENT STRIP/BLOOD GLUCOSE: CPT

## 2018-12-06 PROCEDURE — 84443 ASSAY THYROID STIM HORMONE: CPT | Performed by: INTERNAL MEDICINE

## 2018-12-06 RX ADMIN — FAMOTIDINE 20 MG: 20 TABLET ORAL at 10:07

## 2018-12-06 RX ADMIN — LEVOTHYROXINE SODIUM 50 MCG: 50 TABLET ORAL at 05:51

## 2018-12-06 RX ADMIN — CEFTRIAXONE 1000 MG: 1 INJECTION, SOLUTION INTRAVENOUS at 20:13

## 2018-12-06 RX ADMIN — INSULIN LISPRO 3 UNITS: 100 INJECTION, SOLUTION INTRAVENOUS; SUBCUTANEOUS at 17:19

## 2018-12-06 RX ADMIN — ENOXAPARIN SODIUM 40 MG: 40 INJECTION SUBCUTANEOUS at 10:08

## 2018-12-06 RX ADMIN — POLYETHYLENE GLYCOL 3350 17 G: 17 POWDER, FOR SOLUTION ORAL at 10:08

## 2018-12-06 RX ADMIN — LEVETIRACETAM 500 MG: 100 SOLUTION ORAL at 10:07

## 2018-12-06 RX ADMIN — CLONAZEPAM 0.25 MG: 0.5 TABLET ORAL at 17:19

## 2018-12-06 RX ADMIN — VALPROIC ACID 1000 MG: 250 SOLUTION ORAL at 10:07

## 2018-12-06 RX ADMIN — CLONAZEPAM 0.25 MG: 0.5 TABLET ORAL at 10:08

## 2018-12-06 RX ADMIN — FAMOTIDINE 20 MG: 20 TABLET ORAL at 17:19

## 2018-12-06 RX ADMIN — LEVETIRACETAM 500 MG: 100 SOLUTION ORAL at 00:01

## 2018-12-06 RX ADMIN — INSULIN LISPRO 4 UNITS: 100 INJECTION, SOLUTION INTRAVENOUS; SUBCUTANEOUS at 22:56

## 2018-12-06 RX ADMIN — VALPROIC ACID 1000 MG: 250 SOLUTION ORAL at 20:13

## 2018-12-06 RX ADMIN — CLONAZEPAM 0.25 MG: 0.5 TABLET ORAL at 00:10

## 2018-12-06 RX ADMIN — LEVETIRACETAM 500 MG: 100 SOLUTION ORAL at 17:23

## 2018-12-06 RX ADMIN — VALPROIC ACID 1000 MG: 250 SOLUTION ORAL at 00:03

## 2018-12-06 RX ADMIN — LISINOPRIL 5 MG: 5 TABLET ORAL at 10:07

## 2018-12-06 RX ADMIN — FAMOTIDINE 20 MG: 20 TABLET ORAL at 00:10

## 2018-12-06 NOTE — SPEECH THERAPY NOTE
Speech Language/Pathology  Bedside Swallowing Evaluation    Order received for Bedside Swallowing Evaluation  Chart reviewed  Patient is NPO with G-tube feedings  Called SLP at [facility] for dysphagia treatment history  Awaiting return phone call from therapist   Patient seen at bedside  Bed elevated and patient placed in midline still leaning to left with inability to get head to midline  Patient chewing on blanket  She accepted a 1/2 tsp  Of applesauce from a spoon and did not initiate a swallow  Patient refused presentation of honey thickened liquid from a spoon  Patient unable to participate in oral motor exam   She appears to be endentulous  Will speak with SLP at residence  At this point, patient is not safe for PO intake  Recommendation:  1)  Continue NPO with tube feedings  Will follow      GRAZYNA Lara , Kathi  Pathologist  [phone]

## 2018-12-06 NOTE — SOCIAL WORK
Pt is from [place]  Is non verbal   Pt's brother and sister listed on the face sheet are pt's guardians  Called to brother to confirm d/c plan back to facility  Did speak about IMM that he will receive prior to pt's d/c  Brother's address also added to face sheet  Will follow for d/c

## 2018-12-06 NOTE — NURSING NOTE
As per [facility] staff, patient takes medications via G tube and receives nutrition G tube  On bolus feedings at 5am, 11 am, 4 pm, 8 am  Patient is ACHS diabetic on sliding scale  Some indications that the patient is alert is chewing items like her blankets or rocking  Information received form Gabriella DOLAN at the stated facility

## 2018-12-06 NOTE — NURSING NOTE
Contacted [address] [place] [address] regarding extension piece for G tube  Upon assessment, only G button visualized  Patient's room was searched for extension and was not found  [facility], nursing supervisor, stated that there is someone from the facility who will bring it over in an estimated time of half an hour  Spoke once again with [facility], LPN  She verified that the patient should not take anything orally  Everything, nutrition included, is administered via the G tube

## 2018-12-06 NOTE — CONSULTS
Consultation - Nephrology   Long Sherman 46 y o  female MRN: [id number]  Unit/Bed#: 00356 [facility] 410-01 Encounter: [id number]    ASSESSMENT:  1  Hyponatremia, POA- likely secondary to bronchitis, unclear if patient had any difficulty with nutrition at [facility]  - sodium improved with IVF, received a bolus and then 1L so far  - will discontinue IVF now  - trend sodium level and recheck BMP in the morning  - continue nutrition through G tube  2  Hypertension- BP improved, no changes  3  Tachycardia- per primary team  4  Bronchitis- per primary team    PLAN:  Stop IVF  AM BMP    HISTORY OF PRESENT ILLNESS:  Requesting Physician: Robin Stover MD  Reason for Consult: Hyponatremia    Long Sherman is a 46y o  year old female who was admitted to [address] [place] [address] after presenting with change in mental status and hypertension from [facility]  The patient has been diagnosed with bronchitis  A renal consultation is requested today for assistance in the management of hyponatremia  She was given a bolus of NSS in the ER and then started on gentle NSS IVF  Sodium has improved      PAST MEDICAL HISTORY:  Past Medical History:   Diagnosis Date    Blind     Cerebral atrophy     Deaf     Diabetes mellitus ([address]     Diplegia ([address]     Fibrocystic breast     Glaucoma     Hepatitis B     carrier    Infectious viral hepatitis     Osteoporosis     Pica     PPD positive     Profound intellectual disabilities     RA (rheumatoid arthritis) (HCC)     Rheumatoid arthritis ([address]     Rubella syndrome     Scoliosis     Seizure ([address]     Seizure ([address]     5 in 2016    Spastic diplegia ([address]     Visual impairment        PAST SURGICAL HISTORY:  Past Surgical History:   Procedure Laterality Date    FEEDING TUBE PLACEMENT      [place] BX BREAST W DEVICE 1ST LESION ULTRASOUND GUIDE Left 6/6/2017    Procedure: US GUIDED BREAST BIOPSY NEEDLE LOCALIZED 8 AM;  Surgeon: Liam Ramirez MD;  Location: [address] [place] [address]; Service: General    AL DENTAL SURGERY PROCEDURE N/A 5/22/2017    Procedure: RESTORATIVE DENTAL PROCEDURE, DENTAL EXAM, FULL MOUTH XRAYS, ROOT PLANING AND SCALING, GINGIVECTOMY, IMPRESSIONS X2, PERIO CHARTING, PROPHYLAXIS, D/SENSE, FLUORIDE TREATMENT, COMPOSITE RESTORATION: #8-MFD, #7-DF, #9-MFD, #19-MOL;  Surgeon: Pilar Adams DMD;  Location: [facility];  Service: Oral Surgery    US BREAST NEEDLE LOC LEFT Left 6/6/2017       ALLERGIES:  Allergies   Allergen Reactions    Methazolamide     Tobramycin        SOCIAL HISTORY:  History   Alcohol Use No     History   Drug Use No     History   Smoking Status    Never Smoker   Smokeless Tobacco    Never Used       FAMILY HISTORY:  History reviewed  No pertinent family history      MEDICATIONS:    Current Facility-Administered Medications:     acetaminophen (TYLENOL) tablet 650 mg, 650 mg, Per G Tube, Q6H PRN, Juany Almazan MD    cefTRIAXone (ROCEPHIN) IVPB (premix) 1,000 mg, 1,000 mg, Intravenous, Q24H, Juany Almazan MD, Last Rate: 100 mL/hr at 12/05/18 2323, 1,000 mg at 12/05/18 2323    clonazePAM (KlonoPIN) tablet 0 25 mg, 0 25 mg, Per G Tube, BID, Juany Almazan MD, 0 25 mg at 12/06/18 1008    enoxaparin (LOVENOX) subcutaneous injection 40 mg, 40 mg, Subcutaneous, Daily, Juany Almazan MD, 40 mg at 12/06/18 1008    famotidine (PEPCID) tablet 20 mg, 20 mg, Per G Tube, BID, Juany Almazan MD, 20 mg at 12/06/18 1007    insulin lispro (HumaLOG) 100 units/mL subcutaneous injection 1-5 Units, 1-5 Units, Subcutaneous, HS, Juany Almazan MD, 2 Units at 12/05/18 2335    insulin lispro (HumaLOG) 100 units/mL subcutaneous injection 1-6 Units, 1-6 Units, Subcutaneous, TID AC **AND** Fingerstick Glucose (POCT), , , TID AC, Juany Almazan MD    levalbuterol (XOPENEX) inhalation solution 1 25 mg, 1 25 mg, Nebulization, Q6H PRN, Juany Almazan MD    levETIRAcetam (KEPPRA) oral solution 500 mg, 500 mg, Per G Tube, BID, Juany Almazan MD, 500 mg at 12/06/18 1007    levothyroxine tablet 50 mcg, 50 mcg, Per G Tube, Early Morning, Juany Almazan MD, 50 mcg at 12/06/18 0551    lisinopril (ZESTRIL) tablet 5 mg, 5 mg, Oral, Daily, Juany Almazan MD, 5 mg at 12/06/18 1007    LORazepam (ATIVAN) 2 mg/mL injection 1 mg, 1 mg, Intravenous, Q6H PRN, Juany Almazan MD    polyethylene glycol (MIRALAX) packet 17 g, 17 g, Per G Tube, Daily, Juany Almazan MD, 17 g at 12/06/18 1008    sodium chloride 0 9 % infusion, 50 mL/hr, Intravenous, Continuous, Juany Almazan MD, Last Rate: 50 mL/hr at 12/05/18 2036, 50 mL/hr at 12/05/18 2036    valproic acid (DEPAKENE) 250 MG/5ML oral soln 1,000 mg, 1,000 mg, Per G Tube, Q12H [address], Juany Almazan MD, 1,000 mg at 12/06/18 1007    REVIEW OF SYSTEMS:  A complete review of systems cannot be completed due to profound intellectual disability      PHYSICAL EXAM:  Current Weight: Weight - Scale: 53 3 kg (117 lb 8 1 oz)  First Weight: Weight - Scale: 53 3 kg (117 lb 8 1 oz)  Vitals:    12/05/18 2100 12/05/18 2336 12/06/18 0326 12/06/18 1015   BP:  132/82 140/79 142/97   BP Location:  Left leg Left arm Right arm   Pulse:  103 (!) 112 (!) 110   Resp:  18 18 18   Temp:  (!) 97 3 °F (36 3 °C) (!) 97 3 °F (36 3 °C) 97 5 °F (36 4 °C)   TempSrc:  Temporal Temporal Temporal   SpO2:  97% 100% 98%   Weight: 53 3 kg (117 lb 8 1 oz)      Height: 4' 9" (1 448 m)          Intake/Output Summary (Last 24 hours) at 12/06/18 1120  Last data filed at 12/05/18 1455   Gross per 24 hour   Intake             1050 ml   Output              200 ml   Net              850 ml     General: NAD  Skin: no rash  HEENT: normocephalic  Neck: supple  Chest: CTAB  CVS: RRR  Abdomen: soft nt nd  Extremities: no edema  Neuro: alert awake  Psych: mood and affect appropriate     Lab Results:     Results from last 7 days  Lab Units 12/06/18  0549 12/05/18  2030 12/05/18  1255   WBC Thousand/uL 12 55*  --  11 18*   HEMOGLOBIN g/dL 12 0  --  12 8   HEMATOCRIT % 38 9  -- 39 4   PLATELETS Thousands/uL 205 204 227   POTASSIUM mmol/L 4 6  --  5 1   CHLORIDE mmol/L 98*  --  91*   CO2 mmol/L 26  --  26   BUN mg/dL 7  --  10   CREATININE mg/dL 0 69  --  0 72   CALCIUM mg/dL 9 5  --  9 4   MAGNESIUM mg/dL 2 0  --  1 5*   PHOSPHORUS mg/dL  --   --  1 8*   ALK PHOS U/L  --   --  123*   ALT U/L  --   --  13   AST U/L  --   --  15

## 2018-12-06 NOTE — PLAN OF CARE
DISCHARGE PLANNING     Discharge to home or other facility with appropriate resources Progressing        INFECTION - ADULT     Absence or prevention of progression during hospitalization Progressing        Knowledge Deficit     Patient/family/caregiver demonstrates understanding of disease process, treatment plan, medications, and discharge instructions Progressing        METABOLIC, FLUID AND ELECTROLYTES - ADULT     Electrolytes maintained within normal limits Progressing     Fluid balance maintained Progressing     Glucose maintained within target range Progressing        PAIN - ADULT     Verbalizes/displays adequate comfort level or baseline comfort level Progressing        Potential for Falls     Patient will remain free of falls Progressing        SAFETY ADULT     Maintain or return to baseline ADL function Progressing     Maintain or return mobility status to optimal level Progressing

## 2018-12-06 NOTE — RESPIRATORY THERAPY NOTE
RT Protocol Note  Hank Ours 46 y o  female MRN: [id number]  Unit/Bed#: [address] [place] [address] Encounter: [id number]    Assessment    Principal Problem:    Bronchitis  Active Problems:    Hyponatremia    Hypertension    Hypomagnesemia      Home Pulmonary Medications:  duoneb q6 prn       Past Medical History:   Diagnosis Date    Blind     Cerebral atrophy     Deaf     Diabetes mellitus ([address]     Diplegia ([address]     Fibrocystic breast     Glaucoma     Hepatitis B     carrier    Infectious viral hepatitis     Osteoporosis     Pica     PPD positive     Profound intellectual disabilities     RA (rheumatoid arthritis) ([address]     Rheumatoid arthritis ([address]     Rubella syndrome     Scoliosis     Seizure ([address]     Seizure ([address]     5 in 2016    Sp[facility] dip[facility]     Visual impairment      Social History     Social History    Marital status: Single     Spouse name: N/A    Number of children: N/A    Years of education: N/A     Social History Main Topics    Smoking status: Never Smoker    Smokeless tobacco: Never Used    Alcohol use No    Drug use: No    Sexual activity: Not Asked     Other Topics Concern    None     Social History Narrative    None       Subjective         Objective    Physical Exam:   Assessment Type: Assess only  General Appearance: Sleeping  Respiratory Pattern: Normal  Chest Assessment: Chest expansion symmetrical  Bilateral Breath Sounds: Clear, Diminished    Vitals:  Blood pressure 140/79, pulse (!) 112, temperature (!) 97 3 °F (36 3 °C), temperature source Temporal, resp  rate 18, weight 53 3 kg (117 lb 8 1 oz), SpO2 100 %            Imaging and other studies:          Plan    Respiratory Plan: No distress/Pulmonary history

## 2018-12-06 NOTE — PROGRESS NOTES
Recommend changing tube feed to Jevity 1 2 at 45 mL/hour for a total volume of 1080 mL  This will provide 1296 kcals, 60 grams protein and 872 mL free water,      Recommend flushes of 85 mL water q 4 hours for a total fluid volume of 1382 mL  If bolus is desired, Recommend Jevity 1 2 at 270 mL q 6 hours for a total volume of 1080 mL  Recommend flushes of 128 mL free water after every gravity feeding

## 2018-12-06 NOTE — UTILIZATION REVIEW
Initial Clinical Review    Admission: Date/Time/Statement: 12/5/18 @ 1605     Orders Placed This Encounter   Procedures    Inpatient Admission (expected length of stay for this patient is greater than two midnights)     Standing Status:   Standing     Number of Occurrences:   1     Order Specific Question:   Admitting Physician     Answer:   Ollie Crandall [[id number]]     Order Specific Question:   Level of Care     Answer:   Med Surg [[id number]]     Order Specific Question:   Estimated length of stay     Answer:   More than 2 Midnights     Order Specific Question:   Certification     Answer:   I certify that inpatient services are medically necessary for this patient for a duration of greater than two midnights  See H&P and MD Progress Notes for additional information about the patient's course of treatment  ED: Date/Time/Mode of Arrival:   ED Arrival Information     Expected Arrival Acuity Means of Arrival Escorted By Service Admission Type    - 12/5/2018 12:19 Urgent Wheelchair Other General Medicine Urgent    Arrival Complaint    hypertension/tachycardia          Chief Complaint:   Chief Complaint   Patient presents with    Hypertension     sent from [facility] for tachycardia and hypertention,pt blind and does not talk       History of Illness: 21-year-old female with past medical history of profound mental retardation, seizure, spastic diplegia, baseline deafness, baseline blindness, nonverbal at baseline, sent to the ER from nursing home for evaluation of hypertension and tachycardia       ED Vital Signs:   ED Triage Vitals [12/05/18 1227]   Temperature Pulse Respirations Blood Pressure SpO2   98 8 °F (37 1 °C) (!) 116 20 (!) 192/91 100 %      Temp Source Heart Rate Source Patient Position - Orthostatic VS BP Location FiO2 (%)   Tympanic Monitor Sitting Left arm --      Pain Score       No Pain        Wt Readings from Last 1 Encounters:   12/05/18 53 3 kg (117 lb 8 1 oz)       Vital Signs (abnormal):   Date and Time Temp Pulse SpO2 Resp BP   12/05/18 1753 98 1 °F (36 7 °C) 99 94 % 20 128/74   12/05/18 1647 -- 101 99 % 20 167/91   12/05/18 1447 --  109 98 % 18  186/86   12/05/18 1330 -- 102 98 % 15  211/126     Abnormal Labs/Diagnostic Test Results: Na 127, Glucose 217, Alk Phos 123, Magnesium 123, Phosphorus 1 8, NT-pro , D-Dimer 630, WBC 11 18    CTA Chest PE Study: No pulmonary arterial embolism  No consolidation/infiltrate   Mild bilateral bronchial wall thickening most prominent in the lung bases in keeping with a nonspecific bronchitis      EKG: Sinus Tachycardia    ED Treatment:   Medication Administration from 12/05/2018 1219 to 12/05/2018 1810       Date/Time Order Dose Route Action Action by Comments     12/05/2018 1258 sodium chloride 0 9 % bolus 1,000 mL 1,000 mL Intravenous New Bag Lilian Tong RN      12/05/2018 1355 magnesium sulfate 2 g/50 mL IVPB (premix) 2 g 2 g Intravenous New [facility] Muriel, RN      12/05/2018 1428 iohexol (OMNIPAQUE) 350 MG/ML injection (MULTI-DOSE) 85 mL 85 mL Intravenous Given Dorcarlose Getting      12/05/2018 1630 methylPREDNISolone sodium succinate (Solu-MEDROL) injection 60 mg 60 mg Intravenous Given Lilian Tnog RN      12/05/2018 1642 ipratropium-albuterol (DUO-NEB) 0 5-2 5 mg/3 mL inhalation solution 3 mL 3 mL Nebulization Given Lilian Tong RN      12/05/2018 1640 azithromycin (ZITHROMAX) 500 mg in sodium chloride 0 9% 250mL IVPB 500 mg 500 mg Intravenous New Bag Lilian Tong, TOMASZ           Past Medical/Surgical History:   Diagnosis    Blind    Cerebral atrophy    Deaf    Diabetes mellitus ([address]    Diplegia ([facility]    Fibrocystic breast    Glaucoma    Hepatitis B    Infectious viral hepatitis    Osteoporosis    Pica    PPD positive    Profound intellectual disabilities    RA (rheumatoid arthritis) (HCC)    Rheumatoid arthritis (HCC)    Rubella syndrome    Scoliosis    Seizure ([address]    Seizure ([address]    Spastic diplegia ([facility])    Visual impairment       Admitting Diagnosis: Hypomagnesemia [E83 42]  Hyponatremia [E87 1]  Bronchitis [J40]  Hypertension [I10]    Age/Sex: 46 y o  female    Assessment/Plan:     Acute bronchitis  Hyponatremia  Hypomagnesemia  Hypertension and tachycardia getting better  Diabetes mellitus  Profound intellectual disability  Seizure disorder  Spastic diplegia  History of rheumatoid arthritis     Plan:  IV antibiotic Rocephin  IV fluids  Nephrology consultation  Nebulizer treatments  Resume the medications patient was on before     Code Status: Level 1 - Full Code  Advance Directive and Living Will:      Power of :    POLST:         Admission Orders:  Inpatient/Tele  Continuous Cardiac Monitoring  Serial Cardiac Enzymes q3h x 3  Consult Renal r/e hyponatremia, hypomagnesium   Bilateral Sequential Compression Device  Sputum Culture Pending  SSI    Scheduled Meds:   Current Facility-Administered Medications:  cefTRIAXone 1,000 mg Intravenous Q24H   clonazePAM 0 25 mg Per G Tube BID   enoxaparin 40 mg Subcutaneous Daily   famotidine 20 mg Per G Tube BID   insulin lispro 1-5 Units Subcutaneous HS   insulin lispro 1-6 Units Subcutaneous TID AC   levETIRAcetam 500 mg Per G Tube BID   levothyroxine 50 mcg Per G Tube Early Morning   lisinopril 5 mg Oral Daily   polyethylene glycol 17 g Per G Tube Daily   valproic acid 1,000 mg Per G Tube Q12H [address]     PRN Meds:   acetaminophen    levalbuterol    LORazepam

## 2018-12-06 NOTE — PLAN OF CARE
Problem: DISCHARGE PLANNING - CARE MANAGEMENT  Goal: Discharge to post-acute care or home with appropriate resources  INTERVENTIONS:  - Conduct assessment to determine patient/family and health care team treatment goals, and need for post-acute services based on payer coverage, community resources, and patient preferences, and barriers to discharge  - Address psychosocial, clinical, and financial barriers to discharge as identified in assessment in conjunction with the patient/family and health care team  - Arrange appropriate level of post-acute services according to patient's   needs and preference and payer coverage in collaboration with the physician and health care team  - Communicate with and update the patient/family, physician, and health care team regarding progress on the discharge plan  - Arrange appropriate transportation to post-acute venues  Outcome: Adequate for Discharge  Plan:  Discharge to [facility] when medically stable

## 2018-12-06 NOTE — H&P
H&P Exam - Samantha Grigsby 46 y o  female MRN: [id number]    Unit/Bed#: [address] [place] [address] Encounter: [id number]      History of Present Illness     HPI:  Samantha Grigsby is a 46 y o  female who presents with hypertension and tachycardia  Patient is a resident of [facility] with profound intellectual disability seizure disorder spastic diplegia with deafness blindness nonverbal   Workup in the emergency room has revealed findings suggestive of bronchitis initially her blood pressure was running high but it has started to come down  Labs also revealed a sodium of 127 patient had a CT scan of the chest which has revealed findings suggestive of bronchitis  There was no evidence of PE her D-dimer was running on the high side  Patient subsequently got admitted for further evaluation and treatment patient is diabetic and gets medications through the gastrostomy tube        Review of Systems   Reason unable to perform ROS: Review of system could not be performed because of profound intellectual disability         Historical Information   Past Medical History:   Diagnosis Date    Blind     Cerebral atrophy     Deaf     Diabetes mellitus ([address] )     Diplegia ([address]     Fibrocystic breast     Glaucoma     Hepatitis B     carrier    Infectious viral hepatitis     Osteoporosis     Pica     PPD positive     Profound intellectual disabilities     RA (rheumatoid arthritis) (HCC)     Rheumatoid arthritis (HCC)     Rubella syndrome     Scoliosis     Seizure ([address]     Seizure ([address] )     5 in 2016    Spastic diplegia ([address] )     Visual impairment      Past Surgical History:   Procedure Laterality Date    FEEDING TUBE PLACEMENT      MI BX BREAST W DEVICE 1ST LESION ULTRASOUND GUIDE Left 6/6/2017    Procedure: US GUIDED BREAST BIOPSY NEEDLE LOCALIZED 8 AM;  Surgeon: Channing Dey MD;  Location: [address] [place] [address];  Service: General    MI DENTAL SURGERY PROCEDURE N/A 5/22/2017    Procedure: RESTORATIVE DENTAL PROCEDURE, DENTAL EXAM, FULL MOUTH XRAYS, ROOT PLANING AND SCALING, GINGIVECTOMY, IMPRESSIONS X2, PERIO CHARTING, PROPHYLAXIS, D/SENSE, FLUORIDE TREATMENT, COMPOSITE RESTORATION: #8-MFD, #7-DF, #9-MFD, #19-MOL;  Surgeon: Arnulfo Joaquin DMD;  Location: [address] [place] [address];  Service: Oral Surgery    US BREAST NEEDLE LOC LEFT Left 6/6/2017     History reviewed  No pertinent family history  Social History   History   Alcohol Use No     History   Drug Use No     History   Smoking Status    Never Smoker   Smokeless Tobacco    Never Used         Meds/Allergies     Allergies   Allergen Reactions    Methazolamide     Tobramycin        Prescriptions Prior to Admission   Medication Sig Dispense Refill Last Dose    Calcium Carbonate (CALCI-MIX PO) 1,250 mg by Per G Tube route 2 (two) times a day   6/5/2017 at 1700    clonazePAM (KlonoPIN) 0 5 mg tablet 0 25 mg by Per G Tube route 2 (two) times a day     6/6/2017 at 0630    diazepam (DIASTAT) 10 mg Insert into the rectum as needed   Unknown at Unknown time    Docusate Sodium 100 MG capsule 200 mg by Per G Tube route daily       enalapril (VASOTEC) 2 5 mg tablet 2 5 mg by Per G Tube route daily       famotidine (PEPCID) 20 mg tablet 20 mg by Per G Tube route 2 (two) times a day     6/5/2017 at 0800    glucagon (GLUCAGEN HYPOKIT) 1 mg injection 1 mg daily as needed for low blood sugar For blood sugar less than 70/   Unknown at Unknown time    insulin glargine (LANTUS) 100 units/mL subcutaneous injection Inject 10 Units under the skin daily     6/5/2017 at 0600    insulin lispro (HumaLOG) 100 units/mL injection Inject 2 Units under the skin daily at bedtime       insulin regular (HumuLIN R,NovoLIN R) 100 units/mL injection Inject under the skin 3 (three) times a day before meals     6/5/2017 at 1700    ipratropium-albuterol (DUONEB) 0 5-2 5 mg/mL Inhale 3 mL every 6 (six) hours as needed     Unknown at Unknown time    levETIRAcetam (KEPPRA) 100 mg/mL oral solution 500 mg by Per G Tube route 2 (two) times a day       levothyroxine 25 mcg tablet 50 mcg by Per G Tube route daily     6/5/2017 at 0800    polyethylene glycol (MIRALAX) 17 g packet 17 g by Per G Tube route daily     6/5/2017 at 0800    sitaGLIPtin-metFORMIN (JANUMET)  MG per tablet 1 tablet by Per G Tube route 2 (two) times a day with meals       valproate sodium (DEPAKENE) 250 mg/5 mL syrup 1,250 mg by Per G Tube route 2 (two) times a day Takes 1000mg a m  And 1250 mg hs     6/6/2017 at 0630       Objective   Vitals: Blood pressure 119/94, pulse 97, temperature 98 7 °F (37 1 °C), temperature source Tympanic, resp  rate 18, SpO2 97 %  Intake/Output Summary (Last 24 hours) at 12/05/18 2011  Last data filed at 12/05/18 1455   Gross per 24 hour   Intake             1050 ml   Output              200 ml   Net              850 ml       Invasive Devices     Peripheral Intravenous Line            Peripheral IV 12/05/18 Left Antecubital less than 1 day    Peripheral IV 12/05/18 Right Wrist less than 1 day                Physical Exam   Constitutional: No distress  HENT:   Head: Normocephalic and atraumatic  Eyes: Conjunctivae are normal    Neck: Neck supple  Cardiovascular: Normal rate  No murmur heard  Heart rate is around 90 per minute   Pulmonary/Chest: Effort normal and breath sounds normal    Abdominal: Soft  Bowel sounds are normal  She exhibits no distension  There is no tenderness  There is no rebound  Gastrostomy tube site with the button his clear no evidence of infection   Musculoskeletal: She exhibits deformity  Patient has contractures of all 4 extremities   Neurological:   Patient is sleepy has profound intellectual disability legally blind   Skin: Skin is warm and dry           Lab Results:   Recent Results (from the past 72 hour(s))   CBC and differential    Collection Time: 12/05/18 12:55 PM   Result Value Ref Range    WBC 11 18 (H) 4 31 - 10 16 Thousand/uL    RBC 4 63 3 81 - 5 12 Million/uL    Hemoglobin 12 8 11 5 - 15 4 g/dL    Hematocrit 39 4 34 8 - 46 1 %    MCV 85 82 - 98 fL    MCH 27 6 26 8 - 34 3 pg    MCHC 32 5 31 4 - 37 4 g/dL    RDW 16 1 (H) 11 6 - 15 1 %    MPV 11 3 8 9 - 12 7 fL    Platelets 375 145 - 412 Thousands/uL    nRBC 0 /100 WBCs    Neutrophils Relative 60 43 - 75 %    Immat GRANS % 1 0 - 2 %    Lymphocytes Relative 26 14 - 44 %    Monocytes Relative 12 4 - 12 %    Eosinophils Relative 1 0 - 6 %    Basophils Relative 0 0 - 1 %    Neutrophils Absolute 6 75 1 85 - 7 62 Thousands/µL    Immature Grans Absolute 0 07 0 00 - 0 20 Thousand/uL    Lymphocytes Absolute 2 95 0 60 - 4 47 Thousands/µL    Monocytes Absolute 1 33 (H) 0 17 - 1 22 Thousand/µL    Eosinophils Absolute 0 07 0 00 - 0 61 Thousand/µL    Basophils Absolute 0 01 0 00 - 0 10 Thousands/µL   Protime-INR    Collection Time: 12/05/18 12:55 PM   Result Value Ref Range    Protime 9 5 9 4 - 11 7 seconds    INR 0 90 0 86 - 1 16   APTT    Collection Time: 12/05/18 12:55 PM   Result Value Ref Range    PTT 30 24 - 33 seconds   Comprehensive metabolic panel    Collection Time: 12/05/18 12:55 PM   Result Value Ref Range    Sodium 127 (L) 136 - 145 mmol/L    Potassium 5 1 3 5 - 5 3 mmol/L    Chloride 91 (L) 100 - 108 mmol/L    CO2 26 21 - 32 mmol/L    ANION GAP 10 4 - 13 mmol/L    BUN 10 5 - 25 mg/dL    Creatinine 0 72 0 60 - 1 30 mg/dL    Glucose 217 (H) 65 - 140 mg/dL    Calcium 9 4 8 3 - 10 1 mg/dL    AST 15 5 - 45 U/L    ALT 13 12 - 78 U/L    Alkaline Phosphatase 123 (H) 46 - 116 U/L    Total Protein 7 8 6 4 - 8 2 g/dL    Albumin 2 9 (L) 3 5 - 5 0 g/dL    Total Bilirubin 0 10 (L) 0 20 - 1 00 mg/dL    eGFR 110 ml/min/1 73sq m   Magnesium    Collection Time: 12/05/18 12:55 PM   Result Value Ref Range    Magnesium 1 5 (L) 1 6 - 2 6 mg/dL   Phosphorus    Collection Time: 12/05/18 12:55 PM   Result Value Ref Range    Phosphorus 1 8 (L) 2 7 - 4 5 mg/dL   TSH, 3rd generation with Free T4 reflex    Collection Time: 12/05/18 12:55 PM Result Value Ref Range    TSH 3RD GENERATON 2 946 0 358 - 3 740 uIU/mL   Troponin I    Collection Time: 12/05/18 12:55 PM   Result Value Ref Range    Troponin I <0 02 <=0 04 ng/mL   B-type natriuretic peptide    Collection Time: 12/05/18 12:55 PM   Result Value Ref Range    NT-proBNP 238 (H) <125 pg/mL   D-Dimer    Collection Time: 12/05/18 12:55 PM   Result Value Ref Range    D-Dimer, Quant 630 (H) 190 - 520 ng/ml (FEU)   UA w Reflex to Microscopic w Reflex to Culture    Collection Time: 12/05/18  1:28 PM   Result Value Ref Range    Color, UA Light Yellow     Clarity, UA Clear     Specific [place], UA 1 015 1 000 - 1 030    pH, UA 8 0 5 0 - 9 0    Leukocytes, UA Negative Negative    Nitrite, UA Negative Negative    Protein, UA Negative Negative mg/dl    Glucose,  (1/10%) (A) Negative mg/dl    Ketones, UA Trace (A) Negative mg/dl    Urobilinogen, UA 0 2 0 2, 1 0 E U /dl E U /dl    Bilirubin, UA Negative Negative    Blood, UA Trace-Intact (A) Negative   Urine Microscopic    Collection Time: 12/05/18  1:28 PM   Result Value Ref Range    RBC, UA None Seen None Seen, 0-5 /hpf    WBC, UA 0-5 None Seen, 0-5, 5-55, 5-65 /hpf    Epithelial Cells Occasional None Seen, Occasional /hpf    Bacteria, UA Occasional None Seen, Occasional /hpf   Fingerstick Glucose (POCT)    Collection Time: 12/05/18  8:01 PM   Result Value Ref Range    POC Glucose 216 (H) 65 - 140 mg/dl       Imaging:   XR chest 1 view   Final Result      No active pulmonary disease on examination which is somewhat limited secondary to low lung volumes  Workstation performed: [id number]         CTA ED chest PE study   Final Result      No pulmonary arterial embolism  No consolidation/infiltrate  Mild bilateral bronchial wall thickening most prominent in the lung bases in keeping with a nonspecific bronchitis  The study was marked in EPIC for significant notification        Workstation performed: [id number]             XR chest 1 view   Final Result      No active pulmonary disease on examination which is somewhat limited secondary to low lung volumes  Workstation performed: [id number]         CTA ED chest PE study   Final Result      No pulmonary arterial embolism  No consolidation/infiltrate  Mild bilateral bronchial wall thickening most prominent in the lung bases in keeping with a nonspecific bronchitis  The study was marked in EPIC for significant notification  Workstation performed: [id number]             EKG, Pathology, and Other Studies: I have personally reviewed pertinent reports  Assessment/Plan     Assessment:  Acute bronchitis  Hyponatremia  Hypomagnesemia  Hypertension and tachycardia getting better  Diabetes mellitus  Profound intellectual disability  Seizure disorder  Spastic diplegia  History of rheumatoid arthritis    Plan:  IV antibiotic Rocephin  IV fluids  Nephrology consultation  Nebulizer treatments  Resume the medications patient was on before    Code Status: Level 1 - Full Code  Advance Directive and Living Will:      Power of :    POLST:      Counseling / Coordination of Care  Total floor / unit time spent today 60 minutes  Greater than 50% of total time was spent with the patient and / or family counseling and / or coordination of care  A description of the counseling / coordination of care:  Discussed with emergency room physician emergency room nurse staff on the floor records from the [facility] reviewed

## 2018-12-06 NOTE — PLAN OF CARE
Problem: Nutrition/Hydration-ADULT  Goal: Nutrient/Hydration intake appropriate for improving, restoring or maintaining nutritional needs  Monitor and assess patient's nutrition/hydration status for malnutrition (ex- brittle hair, bruises, dry skin, pale skin and conjunctiva, muscle wasting, smooth red tongue, and disorientation)  Collaborate with interdisciplinary team and initiate plan and interventions as ordered  Monitor patient's weight and dietary intake as ordered or per policy  Utilize nutrition screening tool and intervene per policy  Determine patient's food preferences and provide high-protein, high-caloric foods as appropriate       INTERVENTIONS:  - Monitor oral intake, urinary output, labs, and treatment plans  - Assess nutrition and hydration status and recommend course of action  - Evaluate amount of meals eaten  - Assist patient with eating if necessary   - Allow adequate time for meals  - Recommend/ encourage appropriate diets, oral nutritional supplements, and vitamin/mineral supplements  - Order, calculate, and assess calorie counts as needed  - Recommend, monitor, and adjust tube feedings based on assessed needs  - Assess need for intravenous fluids  - Provide specific nutrition/hydration education as appropriate  - Include patient/family/caregiver in decisions related to nutrition   Outcome: Progressing

## 2018-12-07 VITALS
WEIGHT: 117.5 LBS | RESPIRATION RATE: 18 BRPM | TEMPERATURE: 98.3 F | DIASTOLIC BLOOD PRESSURE: 81 MMHG | BODY MASS INDEX: 25.35 KG/M2 | SYSTOLIC BLOOD PRESSURE: 179 MMHG | HEIGHT: 57 IN | HEART RATE: 114 BPM | OXYGEN SATURATION: 100 %

## 2018-12-07 LAB
ANION GAP SERPL CALCULATED.3IONS-SCNC: 6 MMOL/L (ref 4–13)
BASOPHILS # BLD AUTO: 0.02 THOUSANDS/ΜL (ref 0–0.1)
BASOPHILS NFR BLD AUTO: 0 % (ref 0–1)
BUN SERPL-MCNC: 8 MG/DL (ref 5–25)
CALCIUM SERPL-MCNC: 9.3 MG/DL (ref 8.3–10.1)
CHLORIDE SERPL-SCNC: 102 MMOL/L (ref 100–108)
CO2 SERPL-SCNC: 30 MMOL/L (ref 21–32)
CREAT SERPL-MCNC: 0.71 MG/DL (ref 0.6–1.3)
EOSINOPHIL # BLD AUTO: 0.03 THOUSAND/ΜL (ref 0–0.61)
EOSINOPHIL NFR BLD AUTO: 0 % (ref 0–6)
ERYTHROCYTE [DISTWIDTH] IN BLOOD BY AUTOMATED COUNT: 16.4 % (ref 11.6–15.1)
GFR SERPL CREATININE-BSD FRML MDRD: 112 ML/MIN/1.73SQ M
GLUCOSE SERPL-MCNC: 120 MG/DL (ref 65–140)
GLUCOSE SERPL-MCNC: 223 MG/DL (ref 65–140)
GLUCOSE SERPL-MCNC: 247 MG/DL (ref 65–140)
GLUCOSE SERPL-MCNC: 269 MG/DL (ref 65–140)
HCT VFR BLD AUTO: 35.7 % (ref 34.8–46.1)
HGB BLD-MCNC: 11 G/DL (ref 11.5–15.4)
IMM GRANULOCYTES # BLD AUTO: 0.04 THOUSAND/UL (ref 0–0.2)
IMM GRANULOCYTES NFR BLD AUTO: 0 % (ref 0–2)
LYMPHOCYTES # BLD AUTO: 3.8 THOUSANDS/ΜL (ref 0.6–4.47)
LYMPHOCYTES NFR BLD AUTO: 41 % (ref 14–44)
MCH RBC QN AUTO: 26.9 PG (ref 26.8–34.3)
MCHC RBC AUTO-ENTMCNC: 30.8 G/DL (ref 31.4–37.4)
MCV RBC AUTO: 87 FL (ref 82–98)
MONOCYTES # BLD AUTO: 1.19 THOUSAND/ΜL (ref 0.17–1.22)
MONOCYTES NFR BLD AUTO: 13 % (ref 4–12)
MRSA NOSE QL CULT: NORMAL
NEUTROPHILS # BLD AUTO: 4.26 THOUSANDS/ΜL (ref 1.85–7.62)
NEUTS SEG NFR BLD AUTO: 46 % (ref 43–75)
NRBC BLD AUTO-RTO: 0 /100 WBCS
PLATELET # BLD AUTO: 189 THOUSANDS/UL (ref 149–390)
PMV BLD AUTO: 11.2 FL (ref 8.9–12.7)
POTASSIUM SERPL-SCNC: 4.5 MMOL/L (ref 3.5–5.3)
RBC # BLD AUTO: 4.09 MILLION/UL (ref 3.81–5.12)
SODIUM SERPL-SCNC: 138 MMOL/L (ref 136–145)
WBC # BLD AUTO: 9.34 THOUSAND/UL (ref 4.31–10.16)

## 2018-12-07 PROCEDURE — 92526 ORAL FUNCTION THERAPY: CPT

## 2018-12-07 PROCEDURE — 82948 REAGENT STRIP/BLOOD GLUCOSE: CPT

## 2018-12-07 PROCEDURE — 85025 COMPLETE CBC W/AUTO DIFF WBC: CPT | Performed by: INTERNAL MEDICINE

## 2018-12-07 PROCEDURE — 80048 BASIC METABOLIC PNL TOTAL CA: CPT | Performed by: INTERNAL MEDICINE

## 2018-12-07 PROCEDURE — 99231 SBSQ HOSP IP/OBS SF/LOW 25: CPT | Performed by: INTERNAL MEDICINE

## 2018-12-07 RX ORDER — LEVALBUTEROL 1.25 MG/.5ML
1.25 SOLUTION, CONCENTRATE RESPIRATORY (INHALATION) EVERY 6 HOURS PRN
Qty: 1 EACH | Refills: 0
Start: 2018-12-07

## 2018-12-07 RX ADMIN — LEVOTHYROXINE SODIUM 50 MCG: 50 TABLET ORAL at 05:02

## 2018-12-07 RX ADMIN — VALPROIC ACID 1000 MG: 250 SOLUTION ORAL at 08:51

## 2018-12-07 RX ADMIN — ENOXAPARIN SODIUM 40 MG: 40 INJECTION SUBCUTANEOUS at 08:52

## 2018-12-07 RX ADMIN — FAMOTIDINE 20 MG: 20 TABLET ORAL at 08:52

## 2018-12-07 RX ADMIN — POLYETHYLENE GLYCOL 3350 17 G: 17 POWDER, FOR SOLUTION ORAL at 08:51

## 2018-12-07 RX ADMIN — CLONAZEPAM 0.25 MG: 0.5 TABLET ORAL at 08:51

## 2018-12-07 RX ADMIN — LISINOPRIL 5 MG: 5 TABLET ORAL at 08:51

## 2018-12-07 RX ADMIN — INSULIN LISPRO 3 UNITS: 100 INJECTION, SOLUTION INTRAVENOUS; SUBCUTANEOUS at 08:54

## 2018-12-07 RX ADMIN — LEVETIRACETAM 500 MG: 100 SOLUTION ORAL at 08:53

## 2018-12-07 NOTE — [place] TRANSFER FORM
[place] UN[place] FOR[place]  (ALL ITEMS MUST BE COMPLETED)    1  TRANSFER FROM: [facility]      TRANSFER TO: [place]    2  DATE OF TRANSFER: 12/7/2018                        TIME OF TRANSFER: 14:00    3  PATIENT NAME: Yao Schmidt,        YOB: 1965                             GENDER: female    4  LANGUAGE:   English    5  PHYSICIAN NAME:  Hardik Euceda MD                   PHONE: [phone]    6  CODE STATUS: Level 1 - Full Code        Out of Hospital DNR Attached: No    7  :                                      :  Extended Emergency Contact Information  Primary Emergency Contact: Doe Mello  Address: [address] [facility], [address] [facility] Phone: [phone]  Mobile Phone: [phone]  Relation: Brother  Secondary Emergency Contact: Anaya Shelley [address] Phone: [phone]  Mobile Phone: [phone]  Relation: [id number] [facility] Representative/Proxy:  Yes           Legal Guardian:  Yes             NAME OF:           HEALTH CARE REPRESENTATIVE/PROXY:                                         OR           LEGAL GUARDIAN, IF NOT :                                               PHONE:  (Day)           (Night)                        (Cell)    8  REASON FOR TRANSFER: (Must include brief medical history and recent changes in physical function or cognition )  Patient is medically stable for transfer and transitioned to PO antibiotics to be given via g-tube  Please transport Patient with head elevated due to medication and feeding given by g-tube  V/S: BP (!) 179/81 (BP Location: Right arm)   Pulse (!) 114   Temp 98 3 °F (36 8 °C) (Temporal)   Resp 18   Ht 4' 9" (1 448 m)   Wt 53 3 kg (117 lb 8 1 oz)   SpO2 100%   BMI 25 43 kg/m²           PAIN: None    9   PRIMARY DIAGNOSIS: Bronchitis Secondary Diagnosis:         Pacemaker: No      Internal Defib: No          Mental Health Diagnosis (if Applicable):    10  RESTRAINTS: Yes (describe) Cognitive Deficits     11  RESPIRATORY NEEDS: None    12  ISOLATION/PRECAUTION: None    13  ALLERGY: Methazolamide and Tobramycin    14  SENSORY:       Vision Blind and Hearing Deaf    15  SKIN CONDITION: Yes:  Pressure    16  DIET: Tube Feed    17  IV ACCESS: None    18  PERSONAL ITEMS SENT WITH PATIENT: None    19  ATTACHED DOCUMENTS: MUST ATTACH CURRENT MEDICATION INFORMATION Face Sheet, Medication Reconciliation and Discharge Summary    20  AT RISK ALERTS:Falls        HARM TO: N/A    21  WEIGHT BEARING STATUS:         Left Leg: None        Right Leg: None    22  MENTAL STATUS:Other Cognitive deficits Non communicative     23  FUNCTION:        Walk: Not Able        Transfer: Not Able        Toilet: Not Able        Feed: Not Able    24  IMMUNIZATIONS/SCREENING:     There is no immunization history on file for this patient  25  BOWEL: Incontinent     26  BLADDER: Incontinent    27   SENDING FACILITY CONTACT:  Elsa Alvarez RN, BSN                  Title: Staff RN        Unit: 4N        Phone: [phone] (if known): Jamie Shirley        Title:        Unit:         Phone: [phone]         FORM PREFILLED BY (if applicable)       Title:       Unit:        Phone:         FORM COMPLETED BY Elsa Alvarez RN, BSN      Title: Staff RN      Phone: [phone]

## 2018-12-07 NOTE — PROGRESS NOTES
Progress Note - Oscar Gonzalez 46 y o  female MRN: [id number]    Unit/Bed#: [address] [place] [address] Encounter: [id number]        Subjective:   Patient is nonverbal   Chest profound intellectual disability blind and deaf on tube feeding  Review of Systems    Objective:     Vitals: Blood pressure 107/57, pulse 100, temperature 98 4 °F (36 9 °C), temperature source Oral, resp  rate 18, height 4' 9" (1 448 m), weight 53 3 kg (117 lb 8 1 oz), SpO2 97 %  ,Body mass index is 25 43 kg/m²        Intake/Output Summary (Last 24 hours) at 12/06/18 2117  Last data filed at 12/06/18 1200   Gross per 24 hour   Intake              175 ml   Output               40 ml   Net              135 ml         Current Facility-Administered Medications:     acetaminophen (TYLENOL) tablet 650 mg, 650 mg, Per G Tube, Q6H PRN, Juany Almazan MD    cefTRIAXone (ROCEPHIN) IVPB (premix) 1,000 mg, 1,000 mg, Intravenous, Q24H, Juany Almazan MD, Last Rate: 100 mL/hr at 12/06/18 2013, 1,000 mg at 12/06/18 2013    clonazePAM (KlonoPIN) tablet 0 25 mg, 0 25 mg, Per G Tube, BID, Juany Almazan MD, 0 25 mg at 12/06/18 1719    enoxaparin (LOVENOX) subcutaneous injection 40 mg, 40 mg, Subcutaneous, Daily, Juany Almazan MD, 40 mg at 12/06/18 1008    famotidine (PEPCID) tablet 20 mg, 20 mg, Per G Tube, BID, Juany Almazan MD, 20 mg at 12/06/18 1719    insulin lispro (HumaLOG) 100 units/mL subcutaneous injection 1-5 Units, 1-5 Units, Subcutaneous, HS, Juany Almazan MD, 2 Units at 12/05/18 2335    insulin lispro (HumaLOG) 100 units/mL subcutaneous injection 1-6 Units, 1-6 Units, Subcutaneous, TID AC, 3 Units at 12/06/18 1719 **AND** Fingerstick Glucose (POCT), , , TID AC, Juany Almazan MD    levalbuterol (XOPENEX) inhalation solution 1 25 mg, 1 25 mg, Nebulization, Q6H PRN, Juany Almazan MD    levETIRAcetam (KEPPRA) oral solution 500 mg, 500 mg, Per G Tube, BID, Juany Almazan MD, 500 mg at 12/06/18 1348    levothyroxine tablet 50 mcg, 50 mcg, Per G Tube, Early Morning, Juany Almazan MD, 50 mcg at 12/06/18 0551    lisinopril (ZESTRIL) tablet 5 mg, 5 mg, Oral, Daily, Juany Almazan MD, 5 mg at 12/06/18 1007    LORazepam (ATIVAN) 2 mg/mL injection 1 mg, 1 mg, Intravenous, Q6H PRN, Juany Almazan MD    polyethylene glycol (MIRALAX) packet 17 g, 17 g, Per G Tube, Daily, Juany Almazan MD, 17 g at 12/06/18 1008    valproic acid (DEPAKENE) 250 MG/5ML oral soln 1,000 mg, 1,000 mg, Per G Tube, Q12H [address], Juany Almazan MD, 1,000 mg at 12/06/18 2013     Physical Exam   Constitutional:   Patient is nonverbal not in any distress   HENT:   Head: Normocephalic and atraumatic  Eyes: Left eye exhibits no discharge  No scleral icterus  Neck: Neck supple  No thyromegaly present  Cardiovascular: Normal rate and regular rhythm  Pulmonary/Chest: Effort normal    Decreased breath sounds minimal expiratory wheeze noted   Abdominal:   Gastrostomy tube site is clean abdomen is soft nontender bowel sounds present no organomegaly appreciated   Musculoskeletal: Normal range of motion  She exhibits deformity  She exhibits no edema  Contractures of all extremities noted   Neurological: She is alert  Patient with profound intellectual disability nonverbal   Skin: Skin is warm and dry     Psychiatric: Her behavior is normal            Lab, Imaging and culture:     Lab Results:     Recent Results (from the past 72 hour(s))   CBC and differential    Collection Time: 12/05/18 12:55 PM   Result Value Ref Range    WBC 11 18 (H) 4 31 - 10 16 Thousand/uL    RBC 4 63 3 81 - 5 12 Million/uL    Hemoglobin 12 8 11 5 - 15 4 g/dL    Hematocrit 39 4 34 8 - 46 1 %    MCV 85 82 - 98 fL    MCH 27 6 26 8 - 34 3 pg    MCHC 32 5 31 4 - 37 4 g/dL    RDW 16 1 (H) 11 6 - 15 1 %    MPV 11 3 8 9 - 12 7 fL    Platelets 569 642 - 684 Thousands/uL    nRBC 0 /100 WBCs    Neutrophils Relative 60 43 - 75 %    Immat GRANS % 1 0 - 2 %    Lymphocytes Relative 26 14 - 44 % Monocytes Relative 12 4 - 12 %    Eosinophils Relative 1 0 - 6 %    Basophils Relative 0 0 - 1 %    Neutrophils Absolute 6 75 1 85 - 7 62 Thousands/µL    Immature Grans Absolute 0 07 0 00 - 0 20 Thousand/uL    Lymphocytes Absolute 2 95 0 60 - 4 47 Thousands/µL    Monocytes Absolute 1 33 (H) 0 17 - 1 22 Thousand/µL    Eosinophils Absolute 0 07 0 00 - 0 61 Thousand/µL    Basophils Absolute 0 01 0 00 - 0 10 Thousands/µL   Protime-INR    Collection Time: 12/05/18 12:55 PM   Result Value Ref Range    Protime 9 5 9 4 - 11 7 seconds    INR 0 90 0 86 - 1 16   APTT    Collection Time: 12/05/18 12:55 PM   Result Value Ref Range    PTT 30 24 - 33 seconds   Comprehensive metabolic panel    Collection Time: 12/05/18 12:55 PM   Result Value Ref Range    Sodium 127 (L) 136 - 145 mmol/L    Potassium 5 1 3 5 - 5 3 mmol/L    Chloride 91 (L) 100 - 108 mmol/L    CO2 26 21 - 32 mmol/L    ANION GAP 10 4 - 13 mmol/L    BUN 10 5 - 25 mg/dL    Creatinine 0 72 0 60 - 1 30 mg/dL    Glucose 217 (H) 65 - 140 mg/dL    Calcium 9 4 8 3 - 10 1 mg/dL    AST 15 5 - 45 U/L    ALT 13 12 - 78 U/L    Alkaline Phosphatase 123 (H) 46 - 116 U/L    Total Protein 7 8 6 4 - 8 2 g/dL    Albumin 2 9 (L) 3 5 - 5 0 g/dL    Total Bilirubin 0 10 (L) 0 20 - 1 00 mg/dL    eGFR 110 ml/min/1 73sq m   Magnesium    Collection Time: 12/05/18 12:55 PM   Result Value Ref Range    Magnesium 1 5 (L) 1 6 - 2 6 mg/dL   Phosphorus    Collection Time: 12/05/18 12:55 PM   Result Value Ref Range    Phosphorus 1 8 (L) 2 7 - 4 5 mg/dL   TSH, 3rd generation with Free T4 reflex    Collection Time: 12/05/18 12:55 PM   Result Value Ref Range    TSH 3RD GENERATON 2 946 0 358 - 3 740 uIU/mL   Troponin I    Collection Time: 12/05/18 12:55 PM   Result Value Ref Range    Troponin I <0 02 <=0 04 ng/mL   B-type natriuretic peptide    Collection Time: 12/05/18 12:55 PM   Result Value Ref Range    NT-proBNP 238 (H) <125 pg/mL   D-Dimer    Collection Time: 12/05/18 12:55 PM   Result Value Ref Range D-Dimer, Quant 630 (H) 190 - 520 ng/ml (FEU)   UA w Reflex to Microscopic w Reflex to Culture    Collection Time: 12/05/18  1:28 PM   Result Value Ref Range    Color, UA Light Yellow     Clarity, UA Clear     Specific [place], UA 1 015 1 000 - 1 030    pH, UA 8 0 5 0 - 9 0    Leukocytes, UA Negative Negative    Nitrite, UA Negative Negative    Protein, UA Negative Negative mg/dl    Glucose,  (1/10%) (A) Negative mg/dl    Ketones, UA Trace (A) Negative mg/dl    Urobilinogen, UA 0 2 0 2, 1 0 E U /dl E U /dl    Bilirubin, UA Negative Negative    Blood, UA Trace-Intact (A) Negative   Urine Microscopic    Collection Time: 12/05/18  1:28 PM   Result Value Ref Range    RBC, UA None Seen None Seen, 0-5 /hpf    WBC, UA 0-5 None Seen, 0-5, 5-55, 5-65 /hpf    Epithelial Cells Occasional None Seen, Occasional /hpf    Bacteria, UA Occasional None Seen, Occasional /hpf   ECG 12 lead    Collection Time: 12/05/18  4:57 PM   Result Value Ref Range    Ventricular Rate 102 BPM    Atrial Rate 102 BPM    MI Interval 134 ms    QRSD Interval 64 ms    QT Interval 352 ms    QTC Interval 458 ms    P Axis 56 degrees    QRS Axis 47 degrees    T Wave Axis 67 degrees   Fingerstick Glucose (POCT)    Collection Time: 12/05/18  8:01 PM   Result Value Ref Range    POC Glucose 216 (H) 65 - 140 mg/dl   Platelet count    Collection Time: 12/05/18  8:30 PM   Result Value Ref Range    Platelets 153 314 - 673 Thousands/uL    MPV 10 8 8 9 - 12 7 fL   Fingerstick Glucose (POCT)    Collection Time: 12/05/18 11:32 PM   Result Value Ref Range    POC Glucose 229 (H) 65 - 140 mg/dl   TSH, 3rd generation    Collection Time: 12/06/18  5:49 AM   Result Value Ref Range    TSH 3RD GENERATON 1 406 0 358 - 3 740 uIU/mL   Magnesium    Collection Time: 12/06/18  5:49 AM   Result Value Ref Range    Magnesium 2 0 1 6 - 2 6 mg/dL   CBC (With Platelets)    Collection Time: 12/06/18  5:49 AM   Result Value Ref Range    WBC 12 55 (H) 4 31 - 10 16 Thousand/uL    RBC 4  48 3 81 - 5 12 Million/uL    Hemoglobin 12 0 11 5 - 15 4 g/dL    Hematocrit 38 9 34 8 - 46 1 %    MCV 87 82 - 98 fL    MCH 26 8 26 8 - 34 3 pg    MCHC 30 8 (L) 31 4 - 37 4 g/dL    RDW 16 0 (H) 11 6 - 15 1 %    Platelets 891 233 - 770 Thousands/uL    MPV 11 2 8 9 - 12 7 fL   Basic metabolic panel    Collection Time: 12/06/18  5:49 AM   Result Value Ref Range    Sodium 134 (L) 136 - 145 mmol/L    Potassium 4 6 3 5 - 5 3 mmol/L    Chloride 98 (L) 100 - 108 mmol/L    CO2 26 21 - 32 mmol/L    ANION GAP 10 4 - 13 mmol/L    BUN 7 5 - 25 mg/dL    Creatinine 0 69 0 60 - 1 30 mg/dL    Glucose 151 (H) 65 - 140 mg/dL    Calcium 9 5 8 3 - 10 1 mg/dL    eGFR 114 ml/min/1 73sq m   Hemoglobin A1C    Collection Time: 12/06/18  5:49 AM   Result Value Ref Range    Hemoglobin A1C 7 8 (H) 4 2 - 6 3 %     mg/dl   Osmolality    Collection Time: 12/06/18  5:49 AM   Result Value Ref Range    Osmolality Serum 284 282 - 298 mmol/KG   Fingerstick Glucose (POCT)    Collection Time: 12/06/18  7:40 AM   Result Value Ref Range    POC Glucose 135 65 - 140 mg/dl   Fingerstick Glucose (POCT)    Collection Time: 12/06/18 11:23 AM   Result Value Ref Range    POC Glucose 128 65 - 140 mg/dl   Fingerstick Glucose (POCT)    Collection Time: 12/06/18  4:07 PM   Result Value Ref Range    POC Glucose 246 (H) 65 - 140 mg/dl       XR chest 1 view   Final Result      No active pulmonary disease on examination which is somewhat limited secondary to low lung volumes  Workstation performed: [id number]         CTA ED chest PE study   Final Result      No pulmonary arterial embolism  No consolidation/infiltrate  Mild bilateral bronchial wall thickening most prominent in the lung bases in keeping with a nonspecific bronchitis  The study was marked in EPIC for significant notification        Workstation performed: [id number]             Invasive Devices     Peripheral Intravenous Line            Peripheral IV 12/05/18 Left Antecubital 1 day    Peripheral IV 12/05/18 Right Wrist 1 day          Drain            Gastrostomy/Enterostomy  16 Fr  LUQ 1 day                      Assessment:  Acute bronchitis  Hypernatremia getting better  Hypomagnesemia getting better  Hypertension and tachycardia resolved  NIDDM  Profound intellectual disability  Seizure disorder  Spastic diplegia  History of rheumatoid arthritis    Plan:  Continue IV antibiotics  Hep-Lock IV fluids  BMP CBC tomorrow morning

## 2018-12-07 NOTE — PROGRESS NOTES
[address] [facility] NOTE   Tobi Ohara 46 y o  female MRN: [id number]  Unit/Bed#: [address] [place] [address] Encounter: [id number]  Reason for Consult: Hyponatremia    ASSESSMENT and PLAN:  1  Hyponatremia: Resolved  Na is 138 today  Etiology is volume depletion  IVF stopped yesterday  2  Hypertension: BP labile but controlled for the most part  Continue same oral meds  Nothing further to add from a renal standpoint  Will sign off  Feel free to call us back for any questions or concerns  Thank you! SUBJECTIVE / INTERVAL HISTORY:  No new events  OBJECTIVE:  Current Weight: Weight - Scale: 53 3 kg (117 lb 8 1 oz)  Vitals:    12/07/18 0008 12/07/18 0445 12/07/18 0848 12/07/18 1100   BP: 125/71 116/76 (!) 135/101 (!) 179/81   BP Location:  Left arm Right arm Right arm   Pulse: 96 96 95 (!) 114   Resp:  18 18 18   Temp: (!) 97 3 °F (36 3 °C) 97 8 °F (36 6 °C) 97 8 °F (36 6 °C) 98 3 °F (36 8 °C)   TempSrc: Temporal Oral Temporal Temporal   SpO2: 96% 98% 96% 100%   Weight:       Height:           Intake/Output Summary (Last 24 hours) at 12/07/18 1336  Last data filed at 12/07/18 0800   Gross per 24 hour   Intake              475 ml   Output                0 ml   Net              475 ml     General: non verbal    Chest/Lungs: clear breath sounds  CVS: distinct heart sounds, normal rate  Abdomen: soft  Extremities: no edema       Medications:    Current Facility-Administered Medications:     acetaminophen (TYLENOL) tablet 650 mg, 650 mg, Per G Tube, Q6H PRN, Juany Almazan MD    cefTRIAXone (ROCEPHIN) IVPB (premix) 1,000 mg, 1,000 mg, Intravenous, Q24H, Juany Almazan MD, Last Rate: 100 mL/hr at 12/06/18 2013, 1,000 mg at 12/06/18 2013    clonazePAM (KlonoPIN) tablet 0 25 mg, 0 25 mg, Per G Tube, BID, Juany Almazan MD, 0 25 mg at 12/07/18 0851    enoxaparin (LOVENOX) subcutaneous injection 40 mg, 40 mg, Subcutaneous, Daily, Juany Almazan MD, 40 mg at 12/07/18 0852    famotidine (PEPCID) tablet 20 mg, 20 mg, Per G Tube, BID, Juany Almazan MD, 20 mg at 12/07/18 0852    insulin lispro (HumaLOG) 100 units/mL subcutaneous injection 1-5 Units, 1-5 Units, Subcutaneous, HS, Juany Almazan MD, 4 Units at 12/06/18 2256    insulin lispro (HumaLOG) 100 units/mL subcutaneous injection 1-6 Units, 1-6 Units, Subcutaneous, TID AC, 3 Units at 12/07/18 0854 **AND** Fingerstick Glucose (POCT), , , TID AC, Juany Almazan MD    levalbuterol (XOPENEX) inhalation solution 1 25 mg, 1 25 mg, Nebulization, Q6H PRN, Juany Almazan MD    levETIRAcetam (KEPPRA) oral solution 500 mg, 500 mg, Per G Tube, BID, Juany Almazan MD, 500 mg at 12/07/18 0853    levothyroxine tablet 50 mcg, 50 mcg, Per G Tube, Early Morning, Juany Almazan MD, 50 mcg at 12/07/18 0502    lisinopril (ZESTRIL) tablet 5 mg, 5 mg, Oral, Daily, Juany Almazan MD, 5 mg at 12/07/18 0851    LORazepam (ATIVAN) 2 mg/mL injection 1 mg, 1 mg, Intravenous, Q6H PRN, Juany Almazan MD    polyethylene glycol (MIRALAX) packet 17 g, 17 g, Per G Tube, Daily, Juany Almazan MD, 17 g at 12/07/18 0851    valproic acid (DEPAKENE) 250 MG/5ML oral soln 1,000 mg, 1,000 mg, Per G Tube, Q12H [address], Juany Almazan MD, 1,000 mg at 12/07/18 0851    Laboratory Results:    Results from last 7 days  Lab Units 12/07/18  0459 12/06/18  0549 12/05/18  2030 12/05/18  1255   WBC Thousand/uL 9 34 12 55*  --  11 18*   HEMOGLOBIN g/dL 11 0* 12 0  --  12 8   HEMATOCRIT % 35 7 38 9  --  39 4   PLATELETS Thousands/uL 189 205 204 227   POTASSIUM mmol/L 4 5 4 6  --  5 1   CHLORIDE mmol/L 102 98*  --  91*   CO2 mmol/L 30 26  --  26   BUN mg/dL 8 7  --  10   CREATININE mg/dL 0 71 0 69  --  0 72   CALCIUM mg/dL 9 3 9 5  --  9 4   MAGNESIUM mg/dL  --  2 0  --  1 5*   PHOSPHORUS mg/dL  --   --   --  1 8*     Work up:

## 2018-12-07 NOTE — SOCIAL WORK
Pt ready for d/c today  Left vm for pt's brother to make him aware  Discharge set up done by unit clerk

## 2018-12-07 NOTE — PLAN OF CARE
DISCHARGE PLANNING     Discharge to home or other facility with appropriate resources Adequate for Discharge        DISCHARGE PLANNING - CARE MANAGEMENT     Discharge to post-acute care or home with appropriate resources Adequate for Discharge        INFECTION - ADULT     Absence or prevention of progression during hospitalization Adequate for Discharge        Knowledge Deficit     Patient/family/caregiver demonstrates understanding of disease process, treatment plan, medications, and discharge instructions Adequate for Discharge        METABOLIC, FLUID AND ELECTROLYTES - ADULT     Electrolytes maintained within normal limits Adequate for Discharge     Fluid balance maintained Adequate for Discharge     Glucose maintained within target range Adequate for Discharge        Nutrition/Hydration-ADULT     Nutrient/Hydration intake appropriate for improving, restoring or maintaining nutritional needs Adequate for Discharge        PAIN - ADULT     Verbalizes/displays adequate comfort level or baseline comfort level Adequate for Discharge        Potential for Falls     Patient will remain free of falls Adequate for Discharge        Prexisting or High Potential for Compromised Skin Integrity     Skin integrity is maintained or improved Adequate for Discharge        RESPIRATORY - ADULT     Achieves optimal ventilation and oxygenation Adequate for Discharge        SAFETY ADULT     Maintain or return to baseline ADL function Adequate for Discharge     Maintain or return mobility status to optimal level Adequate for Discharge

## 2018-12-07 NOTE — PLAN OF CARE
DISCHARGE PLANNING     Discharge to home or other facility with appropriate resources Progressing        DISCHARGE PLANNING - CARE MANAGEMENT     Discharge to post-acute care or home with appropriate resources Progressing        INFECTION - ADULT     Absence or prevention of progression during hospitalization Progressing        Knowledge Deficit     Patient/family/caregiver demonstrates understanding of disease process, treatment plan, medications, and discharge instructions Progressing        METABOLIC, FLUID AND ELECTROLYTES - ADULT     Electrolytes maintained within normal limits Progressing     Fluid balance maintained Progressing     Glucose maintained within target range Progressing        Nutrition/Hydration-ADULT     Nutrient/Hydration intake appropriate for improving, restoring or maintaining nutritional needs Progressing        PAIN - ADULT     Verbalizes/displays adequate comfort level or baseline comfort level Progressing        Potential for Falls     Patient will remain free of falls Progressing        Prexisting or High Potential for Compromised Skin Integrity     Skin integrity is maintained or improved Progressing        RESPIRATORY - ADULT     Achieves optimal ventilation and oxygenation Progressing        SAFETY ADULT     Maintain or return to baseline ADL function Progressing     Maintain or return mobility status to optimal level Progressing

## 2018-12-07 NOTE — NURSING NOTE
Patient discharged to [address] [place] [address] via [facility] with [facility]

## 2018-12-07 NOTE — SPEECH THERAPY NOTE
Speech Language/Pathology    Speech/Language Pathology Progress Note    Patient Name: Jamee Marion  QOORM'N Date: 12/7/2018     Problem List  Patient Active Problem List   Diagnosis    Left breast mass    Mass of multiple sites of left breast    Bronchitis    Hyponatremia    Hypertension    Hypomagnesemia        Past Medical History  Past Medical History:   Diagnosis Date    Blind     Cerebral atrophy     Deaf     Diabetes mellitus ([address]     Diplegia ([address]     Fibrocystic breast     Glaucoma     Hepatitis B     carrier    Infectious viral hepatitis     Osteoporosis     Pica     PPD positive     Profound intellectual disabilities     RA (rheumatoid arthritis) ([address]     Rheumatoid arthritis ([address]     Rubella syndrome     Scoliosis     Seizure ([address]     Seizure ([address] )     5 in 2016    Spastic diplegia ([address] )     Visual impairment         Past Surgical History  Past Surgical History:   Procedure Laterality Date    FEEDING TUBE PLACEMENT      TN BX BREAST W DEVICE 1ST LESION ULTRASOUND GUIDE Left 6/6/2017    Procedure: US GUIDED BREAST BIOPSY NEEDLE LOCALIZED 8 AM;  Surgeon: Josesito Champion MD;  Location: [address] [place] [address];  Service: General    TN DENTAL SURGERY PROCEDURE N/A 5/22/2017    Procedure: RESTORATIVE DENTAL PROCEDURE, DENTAL EXAM, FULL MOUTH XRAYS, ROOT PLANING AND SCALING, GINGIVECTOMY, IMPRESSIONS X2, PERIO CHARTING, PROPHYLAXIS, D/SENSE, FLUORIDE TREATMENT, COMPOSITE RESTORATION: #8-MFD, #7-DF, #9-MFD, #19-MOL;  Surgeon: Ruddy Galvan DMD;  Location: [facility];  Service: Oral Surgery    US BREAST NEEDLE LOC LEFT Left 6/6/2017     Subjective:  Patient reclined in bed with repetitive and involuntary movement of upper extremities  Objective:  Spoke with Speech-Language Pathologist at [facility] who reported that G-tube was placed not due to dysphagia but because PO intake did not sustain adequate nutrition or hydration    She is on a puree diet with honey thickened liquids at [facility] but it is unclear amount of intake  Patient accepted honey thickened liquid from a spoon  No evidence of oral preparation, no initiation of the swallow  Patient presented with applesauce which she refused  Assessment:  Swallow skills are not safe for PO intake at this time  Patient did not initiate oral preparation or a swallow  Plan/Recommendations:  1)  Continue NPO      GRAZYNA Ramirez , [facility]  Pathologist  [id number]

## 2018-12-07 NOTE — DISCHARGE SUMMARY
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 has been brought to the emergency room for hypertension and tachycardia  Workup in the emergency room revealed elevated blood pressure white cell count and sodium of 127 and CT scan of the chest revealing findings suggestive of bronchitis and subsequently patient got admitted for further evaluation and treatment  Procedures Performed:   Orders Placed This Encounter   Procedures    ED ECG Documentation Only       Hospital Course:  Patient was admitted to general medical floor  Her blood pressure started to come down heart rate also started to come down she was started on IV antibiotic Rocephin for the bronchitis  Patient received IV fluids normal saline and sodium level gradually came up from 127 to 138  Patient was also seen by the nephrologist   Patient was started on tube feeding last CBC showed hemoglobin of 11 0 hematocrit 35 7  Once patient condition got stabilized she has been discharged back to [facility] on oral antibiotics Ceftin 250 mg twice a day  Patient will be continued on the rest of the medications she was taking before  Condition of the patient at the time of discharge is stable vital signs are stable heart rate is around 90 per minute lungs fair air entry no also rhonchi heart S1-S2 heard abdomen is soft nontender extremities no edema contractures of all 4 extremities noted  Patient is being discharged on the medications mentioned in the after visit summary as well as the end of the discharge summary      Complications:  None    Labs:  Recent Results (from the past 72 hour(s))   CBC and differential    Collection Time: 12/05/18 12:55 PM   Result Value Ref Range    WBC 11 18 (H) 4 31 - 10 16 Thousand/uL    RBC 4 63 3 81 - 5 12 Million/uL    Hemoglobin 12 8 11 5 - 15 4 g/dL    Hematocrit 39 4 34 8 - 46 1 %    MCV 85 82 - 98 fL    MCH 27 6 26 8 - 34 3 pg    MCHC 32 5 31 4 - 37 4 g/dL    RDW 16 1 (H) 11 6 - 15 1 %    MPV 11 3 8 9 - 12 7 fL    Platelets 016 945 - 376 Thousands/uL    nRBC 0 /100 WBCs    Neutrophils Relative 60 43 - 75 %    Immat GRANS % 1 0 - 2 %    Lymphocytes Relative 26 14 - 44 %    Monocytes Relative 12 4 - 12 %    Eosinophils Relative 1 0 - 6 %    Basophils Relative 0 0 - 1 %    Neutrophils Absolute 6 75 1 85 - 7 62 Thousands/µL    Immature Grans Absolute 0 07 0 00 - 0 20 Thousand/uL    Lymphocytes Absolute 2 95 0 60 - 4 47 Thousands/µL    Monocytes Absolute 1 33 (H) 0 17 - 1 22 Thousand/µL    Eosinophils Absolute 0 07 0 00 - 0 61 Thousand/µL    Basophils Absolute 0 01 0 00 - 0 10 Thousands/µL   Protime-INR    Collection Time: 12/05/18 12:55 PM   Result Value Ref Range    Protime 9 5 9 4 - 11 7 seconds    INR 0 90 0 86 - 1 16   APTT    Collection Time: 12/05/18 12:55 PM   Result Value Ref Range    PTT 30 24 - 33 seconds   Comprehensive metabolic panel    Collection Time: 12/05/18 12:55 PM   Result Value Ref Range    Sodium 127 (L) 136 - 145 mmol/L    Potassium 5 1 3 5 - 5 3 mmol/L    Chloride 91 (L) 100 - 108 mmol/L    CO2 26 21 - 32 mmol/L    ANION GAP 10 4 - 13 mmol/L    BUN 10 5 - 25 mg/dL    Creatinine 0 72 0 60 - 1 30 mg/dL    Glucose 217 (H) 65 - 140 mg/dL    Calcium 9 4 8 3 - 10 1 mg/dL    AST 15 5 - 45 U/L    ALT 13 12 - 78 U/L    Alkaline Phosphatase 123 (H) 46 - 116 U/L    Total Protein 7 8 6 4 - 8 2 g/dL    Albumin 2 9 (L) 3 5 - 5 0 g/dL    Total Bilirubin 0 10 (L) 0 20 - 1 00 mg/dL    eGFR 110 ml/min/1 73sq m   Magnesium    Collection Time: 12/05/18 12:55 PM   Result Value Ref Range    Magnesium 1 5 (L) 1 6 - 2 6 mg/dL   Phosphorus Collection Time: 12/05/18 12:55 PM   Result Value Ref Range    Phosphorus 1 8 (L) 2 7 - 4 5 mg/dL   TSH, 3rd generation with Free T4 reflex    Collection Time: 12/05/18 12:55 PM   Result Value Ref Range    TSH 3RD GENERATON 2 946 0 358 - 3 740 uIU/mL   Troponin I    Collection Time: 12/05/18 12:55 PM   Result Value Ref Range    Troponin I <0 02 <=0 04 ng/mL   B-type natriuretic peptide    Collection Time: 12/05/18 12:55 PM   Result Value Ref Range    NT-proBNP 238 (H) <125 pg/mL   D-Dimer    Collection Time: 12/05/18 12:55 PM   Result Value Ref Range    D-Dimer, Quant 630 (H) 190 - 520 ng/ml (FEU)   UA w Reflex to Microscopic w Reflex to Culture    Collection Time: 12/05/18  1:28 PM   Result Value Ref Range    Color, UA Light Yellow     Clarity, UA Clear     Specific [place], UA 1 015 1 000 - 1 030    pH, UA 8 0 5 0 - 9 0    Leukocytes, UA Negative Negative    Nitrite, UA Negative Negative    Protein, UA Negative Negative mg/dl    Glucose,  (1/10%) (A) Negative mg/dl    Ketones, UA Trace (A) Negative mg/dl    Urobilinogen, UA 0 2 0 2, 1 0 E U /dl E U /dl    Bilirubin, UA Negative Negative    Blood, UA Trace-Intact (A) Negative   Urine Microscopic    Collection Time: 12/05/18  1:28 PM   Result Value Ref Range    RBC, UA None Seen None Seen, 0-5 /hpf    WBC, UA 0-5 None Seen, 0-5, 5-55, 5-65 /hpf    Epithelial Cells Occasional None Seen, Occasional /hpf    Bacteria, UA Occasional None Seen, Occasional /hpf   ECG 12 lead    Collection Time: 12/05/18  4:57 PM   Result Value Ref Range    Ventricular Rate 102 BPM    Atrial Rate 102 BPM    KS Interval 134 ms    QRSD Interval 64 ms    QT Interval 352 ms    QTC Interval 458 ms    P Axis 56 degrees    QRS Axis 47 degrees    T Wave Axis 67 degrees   Fingerstick Glucose (POCT)    Collection Time: 12/05/18  8:01 PM   Result Value Ref Range    POC Glucose 216 (H) 65 - 140 mg/dl   MRSA culture    Collection Time: 12/05/18  8:30 PM   Result Value Ref Range    MRSA Culture Only       No Methicillin Resistant Staphlyococcus aureus (MRSA) isolated   Platelet count    Collection Time: 12/05/18  8:30 PM   Result Value Ref Range    Platelets 161 239 - 227 Thousands/uL    MPV 10 8 8 9 - 12 7 fL   Fingerstick Glucose (POCT)    Collection Time: 12/05/18 11:32 PM   Result Value Ref Range    POC Glucose 229 (H) 65 - 140 mg/dl   TSH, 3rd generation    Collection Time: 12/06/18  5:49 AM   Result Value Ref Range    TSH 3RD GENERATON 1 406 0 358 - 3 740 uIU/mL   Magnesium    Collection Time: 12/06/18  5:49 AM   Result Value Ref Range    Magnesium 2 0 1 6 - 2 6 mg/dL   CBC (With Platelets)    Collection Time: 12/06/18  5:49 AM   Result Value Ref Range    WBC 12 55 (H) 4 31 - 10 16 Thousand/uL    RBC 4 48 3 81 - 5 12 Million/uL    Hemoglobin 12 0 11 5 - 15 4 g/dL    Hematocrit 38 9 34 8 - 46 1 %    MCV 87 82 - 98 fL    MCH 26 8 26 8 - 34 3 pg    MCHC 30 8 (L) 31 4 - 37 4 g/dL    RDW 16 0 (H) 11 6 - 15 1 %    Platelets 500 908 - 763 Thousands/uL    MPV 11 2 8 9 - 12 7 fL   Basic metabolic panel    Collection Time: 12/06/18  5:49 AM   Result Value Ref Range    Sodium 134 (L) 136 - 145 mmol/L    Potassium 4 6 3 5 - 5 3 mmol/L    Chloride 98 (L) 100 - 108 mmol/L    CO2 26 21 - 32 mmol/L    ANION GAP 10 4 - 13 mmol/L    BUN 7 5 - 25 mg/dL    Creatinine 0 69 0 60 - 1 30 mg/dL    Glucose 151 (H) 65 - 140 mg/dL    Calcium 9 5 8 3 - 10 1 mg/dL    eGFR 114 ml/min/1 73sq m   Hemoglobin A1C    Collection Time: 12/06/18  5:49 AM   Result Value Ref Range    Hemoglobin A1C 7 8 (H) 4 2 - 6 3 %     mg/dl   Osmolality    Collection Time: 12/06/18  5:49 AM   Result Value Ref Range    Osmolality Serum 284 282 - 298 mmol/KG   Fingerstick Glucose (POCT)    Collection Time: 12/06/18  7:40 AM   Result Value Ref Range    POC Glucose 135 65 - 140 mg/dl   Fingerstick Glucose (POCT)    Collection Time: 12/06/18 11:23 AM   Result Value Ref Range    POC Glucose 128 65 - 140 mg/dl   Fingerstick Glucose (POCT)    Collection Time: 12/06/18  4:07 PM   Result Value Ref Range    POC Glucose 246 (H) 65 - 140 mg/dl   Fingerstick Glucose (POCT)    Collection Time: 12/06/18 10:17 PM   Result Value Ref Range    POC Glucose 373 (H) 65 - 140 mg/dl   CBC and differential    Collection Time: 12/07/18  4:59 AM   Result Value Ref Range    WBC 9 34 4 31 - 10 16 Thousand/uL    RBC 4 09 3 81 - 5 12 Million/uL    Hemoglobin 11 0 (L) 11 5 - 15 4 g/dL    Hematocrit 35 7 34 8 - 46 1 %    MCV 87 82 - 98 fL    MCH 26 9 26 8 - 34 3 pg    MCHC 30 8 (L) 31 4 - 37 4 g/dL    RDW 16 4 (H) 11 6 - 15 1 %    MPV 11 2 8 9 - 12 7 fL    Platelets 846 873 - 807 Thousands/uL    nRBC 0 /100 WBCs    Neutrophils Relative 46 43 - 75 %    Immat GRANS % 0 0 - 2 %    Lymphocytes Relative 41 14 - 44 %    Monocytes Relative 13 (H) 4 - 12 %    Eosinophils Relative 0 0 - 6 %    Basophils Relative 0 0 - 1 %    Neutrophils Absolute 4 26 1 85 - 7 62 Thousands/µL    Immature Grans Absolute 0 04 0 00 - 0 20 Thousand/uL    Lymphocytes Absolute 3 80 0 60 - 4 47 Thousands/µL    Monocytes Absolute 1 19 0 17 - 1 22 Thousand/µL    Eosinophils Absolute 0 03 0 00 - 0 61 Thousand/µL    Basophils Absolute 0 02 0 00 - 0 10 Thousands/µL   Basic metabolic panel    Collection Time: 12/07/18  4:59 AM   Result Value Ref Range    Sodium 138 136 - 145 mmol/L    Potassium 4 5 3 5 - 5 3 mmol/L    Chloride 102 100 - 108 mmol/L    CO2 30 21 - 32 mmol/L    ANION GAP 6 4 - 13 mmol/L    BUN 8 5 - 25 mg/dL    Creatinine 0 71 0 60 - 1 30 mg/dL    Glucose 120 65 - 140 mg/dL    Calcium 9 3 8 3 - 10 1 mg/dL    eGFR 112 ml/min/1 73sq m   Fingerstick Glucose (POCT)    Collection Time: 12/07/18  7:32 AM   Result Value Ref Range    POC Glucose 269 (H) 65 - 140 mg/dl   Fingerstick Glucose (POCT)    Collection Time: 12/07/18 10:55 AM   Result Value Ref Range    POC Glucose 223 (H) 65 - 140 mg/dl     XR chest 1 view   Final Result      No active pulmonary disease on examination which is somewhat limited secondary to low lung volumes  Workstation performed: [id number]         CTA ED chest PE study   Final Result      No pulmonary arterial embolism  No consolidation/infiltrate  Mild bilateral bronchial wall thickening most prominent in the lung bases in keeping with a nonspecific bronchitis  The study was marked in EPIC for significant notification  Workstation performed: [id number]           Invasive Devices     Drain            Gastrostomy/Enterostomy  16 Fr  LUQ 1 day                 Discharge Diagnosis:   Acute bronchitis  Hyponatremia resolved  Hypomagnesemia resolved  Hypertension getting so much better  Tachycardia resolved  NIDDM  Profound intellectual disability  Seizure disorder  Spastic diplegia  History of rheumatoid arthritis    Plan:  Patient will be continued on the medications mentioned in the after visit summary including Ceftin 250 mg twice a day    Condition at Discharge: fair     Discharge instructions/Information to patient and family:   See after visit summary for information provided to patient and family  Provisions for Follow-Up Care:  See after visit summary for information related to follow-up care and any pertinent home health orders  Disposition: Skilled nursing facility at [facility]        Discharge Statement   I spent 30 minutes discharging the patient  This time was spent on the day of discharge  I had direct contact with the patient on the day of discharge  Additional documentation is required if more than 30 minutes were spent on discharge  Discharge Medications:  See after visit summary for reconciled discharge medications provided to patient and family          Current Discharge Medication List      CONTINUE these medications which have NOT CHANGED    Details   Calcium Carbonate (CALCI-MIX PO) 1,250 mg by Per G Tube route 2 (two) times a day      clonazePAM (KlonoPIN) 0 5 mg tablet 0 25 mg by Per G Tube route 2 (two) times a day Docusate Sodium 100 MG capsule 200 mg by Per G Tube route daily      enalapril (VASOTEC) 2 5 mg tablet 2 5 mg by Per G Tube route daily      famotidine (PEPCID) 20 mg tablet 20 mg by Per G Tube route 2 (two) times a day        insulin glargine (LANTUS) 100 units/mL subcutaneous injection Inject 10 Units under the skin daily        insulin lispro (HumaLOG) 100 units/mL injection Inject 2 Units under the skin daily at bedtime      insulin regular (HumuLIN R,NovoLIN R) 100 units/mL injection Inject under the skin 3 (three) times a day before meals  levETIRAcetam (KEPPRA) 100 mg/mL oral solution 500 mg by Per G Tube route 2 (two) times a day      levothyroxine 25 mcg tablet 50 mcg by Per G Tube route daily        polyethylene glycol (MIRALAX) 17 g packet 17 g by Per G Tube route daily        sitaGLIPtin-metFORMIN (JANUMET)  MG per tablet 1 tablet by Per G Tube route 2 (two) times a day with meals      valproate sodium (DEPAKENE) 250 mg/5 mL syrup 1,250 mg by Per G Tube route 2 (two) times a day Takes 1000mg a m   And 1250 mg hs        diazepam (DIASTAT) 10 mg Insert into the rectum as needed      glucagon (GLUCAGEN HYPOKIT) 1 mg injection 1 mg daily as needed for low blood sugar For blood sugar less than 70/               Current Discharge Medication List      STOP taking these medications       ipratropium-albuterol (DUONEB) 0 5-2 5 mg/mL Comments:   Reason for Stopping:                 Current Discharge Medication List      START taking these medications    Details   cefuroxime (CEFTIN) 250 mg/5 mL suspension Take 5 mL (250 mg total) by mouth 2 (two) times a day for 7 days  Qty: 100 mL, Refills: 0    Associated Diagnoses: Bronchitis      levalbuterol (XOPENEX) 1 25 mg/0 5 mL nebulizer solution Take 0 5 mL (1 25 mg total) by nebulization every 6 (six) hours as needed for wheezing  Qty: 1 each, Refills: 0    Associated Diagnoses: Bronchitis

## 2018-12-10 ENCOUNTER — EPISODE CHANGES (OUTPATIENT)
Dept: CASE MANAGEMENT | Facility: HOSPITAL | Age: 53
End: 2018-12-10

## 2018-12-11 ENCOUNTER — EPISODE CHANGES (OUTPATIENT)
Dept: CASE MANAGEMENT | Facility: HOSPITAL | Age: 53
End: 2018-12-11

## 2018-12-19 ENCOUNTER — PATIENT OUTREACH (OUTPATIENT)
Dept: CASE MANAGEMENT | Facility: OTHER | Age: 53
End: 2018-12-19

## 2018-12-19 NOTE — PROGRESS NOTES
Patient is a resident of [facility] where she receives 24/7 nursing care  Per Pretty Tobias, nurse, they are monitoring her labs for hyponatremia & she is on fluid restrictions, but she does not know the exact amount

## 2018-12-28 ENCOUNTER — EPISODE CHANGES (OUTPATIENT)
Dept: CASE MANAGEMENT | Facility: HOSPITAL | Age: 53
End: 2018-12-28

## 2019-03-07 ENCOUNTER — PATIENT OUTREACH (OUTPATIENT)
Dept: CASE MANAGEMENT | Facility: OTHER | Age: 54
End: 2019-03-07

## 2019-03-07 NOTE — PROGRESS NOTES
3/7/19-spoke with Marybeth Tang from [facility]   Discussed bundle patient info  She will be emailing a sheet with all of the bundle patient updates post call  Advised bundle episode closed 3/6/19, this CM will no longer be following bundle patient  Case closed

## (undated) DEVICE — SLEEVE CURE HANDLE COVER MODEL 4551

## (undated) DEVICE — GLOVE SRG BIOGEL 7

## (undated) DEVICE — TRAY IMPRESSION #1 LG/U PERFORATED

## (undated) DEVICE — ASTOUND IMPERVIOUS SURGICAL GOWN: Brand: CONVERTORS

## (undated) DEVICE — SUT MONOCRYL 4-0 PC-3 18 IN Y845G

## (undated) DEVICE — DENTAL PACK: Brand: CARDINAL HEALTH

## (undated) DEVICE — INTENDED FOR TISSUE SEPARATION, AND OTHER PROCEDURES THAT REQUIRE A SHARP SURGICAL BLADE TO PUNCTURE OR CUT.: Brand: BARD-PARKER ® CARBON RIB-BACK BLADES

## (undated) DEVICE — ACCLEAN PROPHY PASTE COARSE: Brand: HENRY SCHEIN

## (undated) DEVICE — GLOVE INDICATOR PI UNDERGLOVE SZ 8.5 BLUE

## (undated) DEVICE — SYRINGE 10ML LL CONTROL TOP

## (undated) DEVICE — SPONGE PVP SCRUB WING STERILE

## (undated) DEVICE — Device

## (undated) DEVICE — GLOVE INDICATOR PI UNDERGLOVE SZ 7.5 BLUE

## (undated) DEVICE — GLOVE SRG BIOGEL 8

## (undated) DEVICE — DISPOS-A-BRUSH FINE BRISTLE

## (undated) DEVICE — SPECIMEN SOCK - SHORT: Brand: MEDI-VAC

## (undated) DEVICE — SANI-TIP® DISPOSABLE AIR/WATER SYRINGE TIP. CONTENTS:  STANDARD REGULAR KIT - 250 TIPS AND 250 SANI-SHIELD® SLEEVES.: Brand: SANI-TIP®

## (undated) DEVICE — SUT VICRYL 3-0 SH 27 IN J416H

## (undated) DEVICE — SCD SEQUENTIAL COMPRESSION COMFORT SLEEVE MEDIUM KNEE LENGTH: Brand: KENDALL SCD

## (undated) DEVICE — SYRINGE BRIXTON AIRWATER SLEEVE CLEAR

## (undated) DEVICE — TRAY IMPRESSION #2 L/L PERFORATED

## (undated) DEVICE — GROUNDING PAD UNIVERSAL SLW

## (undated) DEVICE — 3M™ STERI-STRIP™ REINFORCED ADHESIVE SKIN CLOSURES, R1546, 1/4 IN X 4 IN (6 MM X 100 MM), 10 STRIPS/ENVELOPE: Brand: 3M™ STERI-STRIP™

## (undated) DEVICE — ROCKER SWITCH PENCIL HOLSTER: Brand: VALLEYLAB

## (undated) DEVICE — NEEDLE 25G X 1 1/2

## (undated) DEVICE — SPONGE GAUZE 4 X 8 12 PLY STRL LF

## (undated) DEVICE — MONOJECT NEEDLES 27 GA SHORT METAL HUB

## (undated) DEVICE — MICRODISSECTION NEEDLE: Brand: COLORADO

## (undated) DEVICE — BASIC DOUBLE BASIN 2-LF: Brand: MEDLINE INDUSTRIES, INC.

## (undated) DEVICE — ASTOUND STANDARD SURGICAL GOWN, XL: Brand: CONVERTORS

## (undated) DEVICE — MINOR PROCEDURE DRAPE: Brand: CONVERTORS

## (undated) DEVICE — MICROBRUSH PLUS DISP SERIES FINE

## (undated) DEVICE — PROPHY ANGLE FIRM WHITE DISP LF

## (undated) DEVICE — PACK GENERAL LF

## (undated) DEVICE — 3M™ TEGADERM™ TRANSPARENT FILM DRESSING FRAME STYLE, 1626W, 4 IN X 4-3/4 IN (10 CM X 12 CM), 50/CT 4CT/CASE: Brand: 3M™ TEGADERM™

## (undated) DEVICE — SUT VICRYL 3-0 18 IN J904T

## (undated) DEVICE — DISPOS-A BRUSH STANDARD

## (undated) DEVICE — LABEL STERILE (RSC) (2-SENSOR CAINE 2-LIDOCAINE 2-HEPARIN)